# Patient Record
Sex: MALE | Race: WHITE | NOT HISPANIC OR LATINO | Employment: FULL TIME | URBAN - NONMETROPOLITAN AREA
[De-identification: names, ages, dates, MRNs, and addresses within clinical notes are randomized per-mention and may not be internally consistent; named-entity substitution may affect disease eponyms.]

---

## 2019-08-23 ENCOUNTER — OFFICE VISIT (OUTPATIENT)
Dept: FAMILY MEDICINE CLINIC | Facility: CLINIC | Age: 34
End: 2019-08-23

## 2019-08-23 VITALS
HEART RATE: 89 BPM | HEIGHT: 72 IN | SYSTOLIC BLOOD PRESSURE: 132 MMHG | TEMPERATURE: 98 F | OXYGEN SATURATION: 98 % | BODY MASS INDEX: 23.76 KG/M2 | WEIGHT: 175.4 LBS | DIASTOLIC BLOOD PRESSURE: 82 MMHG

## 2019-08-23 DIAGNOSIS — F32.A ANXIETY AND DEPRESSION: Primary | ICD-10-CM

## 2019-08-23 DIAGNOSIS — F41.9 ANXIETY AND DEPRESSION: Primary | ICD-10-CM

## 2019-08-23 PROCEDURE — 99202 OFFICE O/P NEW SF 15 MIN: CPT | Performed by: STUDENT IN AN ORGANIZED HEALTH CARE EDUCATION/TRAINING PROGRAM

## 2019-08-23 RX ORDER — DULOXETIN HYDROCHLORIDE 30 MG/1
30 CAPSULE, DELAYED RELEASE ORAL DAILY
Qty: 30 CAPSULE | Refills: 12 | Status: SHIPPED | OUTPATIENT
Start: 2019-08-23 | End: 2020-10-19 | Stop reason: SDUPTHER

## 2019-08-23 RX ORDER — DULOXETIN HYDROCHLORIDE 30 MG/1
30 CAPSULE, DELAYED RELEASE ORAL DAILY
COMMUNITY
End: 2019-08-23 | Stop reason: SDUPTHER

## 2019-08-23 NOTE — PROGRESS NOTES
Subjective   Wang Edwards is a 34 y.o. male who presents for initial evaluation  for medication refill for his Duloxitine that he has been taking since starting Pharmacy school. 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 Wang lives in [place], and is well informed on his care and medication uses. He stated he wanted to reduce the dose from 40mg to 30mg, which he was previously on, it was effective, and agrees it will suffice for his anxiety/depression management. We discussed follow up in 1 year, unless he needs to see me for anything.       Past Medical Hx:  Past Medical History:   Diagnosis Date   • Anxiety        Past Surgical Hx:  History reviewed. No pertinent surgical history.    Health Maintenance:  Health Maintenance   Topic Date Due   • TDAP/TD VACCINES (1 - Tdap) 06/19/2005   • INFLUENZA VACCINE  08/01/2019   • ANNUAL PHYSICAL  08/24/2020       Current Meds:    Current Outpatient Medications:   •  DULoxetine (CYMBALTA) 30 MG capsule, Take 1 capsule by mouth Daily., Disp: 30 capsule, Rfl: 12    Allergies:  Patient has no known allergies.    Family Hx:  Family History   Problem Relation Age of Onset   • No Known Problems Mother    • No Known Problems Father    • No Known Problems Sister    • No Known Problems Brother    • No Known Problems Daughter    • No Known Problems Son    • No Known Problems Maternal Aunt    • No Known Problems Maternal Uncle    • No Known Problems Paternal Aunt    • No Known Problems Paternal Uncle    • No Known Problems Maternal Grandmother    • No Known Problems Maternal Grandfather    • No Known Problems Paternal Grandmother    • No Known Problems Paternal Grandfather         Social History:  Social History     Socioeconomic History   • Marital status:      Spouse name: Not on file   • Number of children: Not on file   • Years of education: Not on file   • Highest education level: Not on file   Tobacco  "Use   • Smoking status: Never Smoker   • Smokeless tobacco: Never Used   Substance and Sexual Activity   • Alcohol use: No     Frequency: Never   • Drug use: No   • Sexual activity: Yes     Partners: Female       Review of Systems  Review of Systems   Constitutional: Negative for activity change.   Eyes: Negative for visual disturbance.   Respiratory: Negative for cough and shortness of breath.    Cardiovascular: Negative for chest pain.            Objective:     /82 (BP Location: Left arm, Patient Position: Sitting)   Pulse 89   Temp 98 °F (36.7 °C) (Temporal)   Ht 182.9 cm (72\")   Wt 79.6 kg (175 lb 6.4 oz)   SpO2 98%   BMI 23.79 kg/m²       Physical Exam   Constitutional: He is oriented to person, place, and time. He appears well-developed and well-nourished.   HENT:   Head: Normocephalic.   Eyes: Pupils are equal, round, and reactive to light.   Neck: Neck supple.   Cardiovascular: Normal rate, regular rhythm and normal heart sounds.   Pulmonary/Chest: Effort normal and breath sounds normal.   Neurological: He is alert and oriented to person, place, and time.   Psychiatric: He has a normal mood and affect. His behavior is normal. Judgment and thought content normal.       Assessment/Plan:     1. Anxiety and depression   - Refilled Duloxitine; decreased from 40mg to 30mg.  - F/u as needed        Follow-up:     Return in about 1 year (around 8/23/2020) for Recheck.    Goals     None          Preventative:    Vaccines Recommended at this visit:   No Vaccines recommended today. Patient is up to date on all vaccines.     Vaccines Received at this visit:  No Vaccines recommended today. Patient is up to date on all vaccines.     Screenings Recommended at this visit:  No Screenings offered today. Patient is up to date on all screenings at this time.     Screenings Ordered at this visit:  No screenings were offered today. Patient is up to date on all screenings.     Smoking Status:  Patient has never " smoked.    Alcohol Intake:  Patient does not drink    Patient's Body mass index is 23.79 kg/m². BMI is within normal parameters. No follow-up required..         RISK SCORE: 1       This document has been electronically signed by Unique Cabrera MD on August 23, 2019 5:08 PM

## 2019-08-30 NOTE — PROGRESS NOTES
Subjective:     Wang Edwards is a 34 y.o. male who presents for refills for anxiety. 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 He is okay switching to lower dose as the pharmacy has given him some issues with the dose in the past        Past Medical Hx:  Past Medical History:   Diagnosis Date   • Anxiety        Past Surgical Hx:  History reviewed. No pertinent surgical history.    Health Maintenance:  Health Maintenance   Topic Date Due   • TDAP/TD VACCINES (1 - Tdap) 06/19/2005   • INFLUENZA VACCINE  08/01/2019   • ANNUAL PHYSICAL  08/24/2020       Current Meds:    Current Outpatient Medications:   •  DULoxetine (CYMBALTA) 30 MG capsule, Take 1 capsule by mouth Daily., Disp: 30 capsule, Rfl: 12    Allergies:  Patient has no known allergies.    Family Hx:  Family History   Problem Relation Age of Onset   • No Known Problems Mother    • No Known Problems Father    • No Known Problems Sister    • No Known Problems Brother    • No Known Problems Daughter    • No Known Problems Son    • No Known Problems Maternal Aunt    • No Known Problems Maternal Uncle    • No Known Problems Paternal Aunt    • No Known Problems Paternal Uncle    • No Known Problems Maternal Grandmother    • No Known Problems Maternal Grandfather    • No Known Problems Paternal Grandmother    • No Known Problems Paternal Grandfather         Social History:  Social History     Socioeconomic History   • Marital status:      Spouse name: Not on file   • Number of children: Not on file   • Years of education: Not on file   • Highest education level: Not on file   Tobacco Use   • Smoking status: Never Smoker   • Smokeless tobacco: Never Used   Substance and Sexual Activity   • Alcohol use: No     Frequency: Never   • Drug use: No   • Sexual activity: Yes     Partners: Female       Review of Systems  Review of Systems  Per HPI. Other ROS negative  Objective:     /82 (BP Location: Left arm, Patient Position: Sitting)    "Pulse 89   Temp 98 °F (36.7 °C) (Temporal)   Ht 182.9 cm (72\")   Wt 79.6 kg (175 lb 6.4 oz)   SpO2 98%   BMI 23.79 kg/m²   Physical Exam    NAD  ncat  perrl EOMI  Alert and oriented x 3  Hearing intact, normal ext ears bilat  Neck supple  RRR, no mRG  Ctab, no WRR  SNT  MAEE, no issues, normal tone  Cn grossly intact  Psych: appropriate    Lab Review  No results found for this or any previous visit.         Assessment:     Wang was seen today for annual exam and depression.    Diagnoses and all orders for this visit:    Anxiety and depression  -     DULoxetine (CYMBALTA) 30 MG capsule; Take 1 capsule by mouth Daily.        Plan:     I have seen and examined the patient.  I have reviewed the notes, assessments, and/or procedures performed by *Unique Cabrera MD  *, I concur with her/his documentation and assessment and plan for Wang Edwards. Will refill meds and see back prn                    This document has been electronically signed by Juan Luis Adam MD on August 30, 2019 8:40 AM    "

## 2020-01-06 DIAGNOSIS — J06.9 UPPER RESPIRATORY TRACT INFECTION, UNSPECIFIED TYPE: Primary | ICD-10-CM

## 2020-01-06 RX ORDER — AMOXICILLIN AND CLAVULANATE POTASSIUM 875; 125 MG/1; MG/1
1 TABLET, FILM COATED ORAL 2 TIMES DAILY
Qty: 10 TABLET | Refills: 0 | Status: SHIPPED | OUTPATIENT
Start: 2020-01-06 | End: 2020-01-11

## 2020-09-04 RX ORDER — CETIRIZINE HYDROCHLORIDE 10 MG/1
10 TABLET ORAL DAILY
Qty: 30 TABLET | Refills: 11 | Status: SHIPPED | OUTPATIENT
Start: 2020-09-04 | End: 2021-03-25 | Stop reason: SDUPTHER

## 2020-09-04 RX ORDER — FLUTICASONE PROPIONATE 50 MCG
2 SPRAY, SUSPENSION (ML) NASAL DAILY
Qty: 18.2 ML | Refills: 11 | Status: SHIPPED | OUTPATIENT
Start: 2020-09-04 | End: 2021-03-25 | Stop reason: SDUPTHER

## 2020-09-04 RX ORDER — ALBUTEROL SULFATE 90 UG/1
2 AEROSOL, METERED RESPIRATORY (INHALATION) EVERY 4 HOURS PRN
Qty: 18 G | Refills: 11 | Status: SHIPPED | OUTPATIENT
Start: 2020-09-04 | End: 2021-03-25 | Stop reason: SDUPTHER

## 2020-09-04 RX ORDER — OMEPRAZOLE 40 MG/1
40 CAPSULE, DELAYED RELEASE ORAL DAILY
Qty: 30 CAPSULE | Refills: 11 | Status: SHIPPED | OUTPATIENT
Start: 2020-09-04 | End: 2021-03-25 | Stop reason: SDUPTHER

## 2020-09-29 DIAGNOSIS — M79.672 FOOT PAIN, LEFT: Primary | ICD-10-CM

## 2020-10-14 ENCOUNTER — OFFICE VISIT (OUTPATIENT)
Dept: PODIATRY | Facility: CLINIC | Age: 35
End: 2020-10-14

## 2020-10-14 VITALS — OXYGEN SATURATION: 98 % | HEIGHT: 71 IN | BODY MASS INDEX: 28.87 KG/M2 | WEIGHT: 206.2 LBS | HEART RATE: 87 BPM

## 2020-10-14 DIAGNOSIS — D49.2 SOFT TISSUE TUMOR OF LEFT FOOT: ICD-10-CM

## 2020-10-14 DIAGNOSIS — M79.672 LEFT FOOT PAIN: Primary | ICD-10-CM

## 2020-10-14 PROCEDURE — 99203 OFFICE O/P NEW LOW 30 MIN: CPT | Performed by: PODIATRIST

## 2020-10-14 NOTE — PROGRESS NOTES
Wang Edwards  1985  35 y.o. male     Patient presents today with a complaint of a painful nodule that is on his left foot; xray obtained today.    10/14/2020    Chief Complaint   Patient presents with   • Left Foot - Pain       History of Present Illness    Wang Edwards is a 35 y.o.male who presents with chief complaint of painful nodule on the inside of his left foot.  Nodule has been present for several years.  However over the last 2 years it has gotten progressively larger and more painful.  Pain is aggravated with pressure and weightbearing.  Pain is relieved with rest.  There are no associated injuries.  Patient has done nothing to treat the nodule.  He has no other complaints today.    Past Medical History:   Diagnosis Date   • Anxiety    • Plantar fasciitis    • Verruca          History reviewed. No pertinent surgical history.      Family History   Problem Relation Age of Onset   • No Known Problems Mother    • No Known Problems Father    • No Known Problems Sister    • No Known Problems Brother    • No Known Problems Daughter    • No Known Problems Son    • No Known Problems Maternal Aunt    • No Known Problems Maternal Uncle    • No Known Problems Paternal Aunt    • No Known Problems Paternal Uncle    • No Known Problems Maternal Grandmother    • No Known Problems Maternal Grandfather    • No Known Problems Paternal Grandmother    • No Known Problems Paternal Grandfather        No Known Allergies    Social History     Socioeconomic History   • Marital status:      Spouse name: Not on file   • Number of children: Not on file   • Years of education: Not on file   • Highest education level: Not on file   Tobacco Use   • Smoking status: Never Smoker   • Smokeless tobacco: Never Used   Substance and Sexual Activity   • Alcohol use: Yes     Frequency: 2-3 times a week   • Drug use: No   • Sexual activity: Yes     Partners: Female         Current Outpatient Medications   Medication Sig Dispense  "Refill   • albuterol sulfate  (90 Base) MCG/ACT inhaler Inhale 2 puffs Every 4 (Four) Hours As Needed for Wheezing. 18 g 11   • cetirizine (zyrTEC) 10 MG tablet Take 1 tablet by mouth Daily. 30 tablet 11   • DULoxetine (CYMBALTA) 30 MG capsule Take 1 capsule by mouth Daily. 30 capsule 12   • fluticasone (Flonase) 50 MCG/ACT nasal spray 2 sprays into the nostril(s) as directed by provider Daily. 18.2 mL 11   • omeprazole (PrilOSEC) 40 MG capsule Take 1 capsule by mouth Daily. 30 capsule 11     No current facility-administered medications for this visit.        Review of Systems   Constitutional: Negative.    HENT: Negative.    Eyes: Negative.    Respiratory: Negative.    Cardiovascular: Negative.    Gastrointestinal: Negative.    Endocrine: Negative.    Genitourinary: Negative.    Musculoskeletal:        Left foot pain     Skin: Negative.    Psychiatric/Behavioral: Negative.         Depression           OBJECTIVE    Pulse 87   Ht 180.3 cm (71\")   Wt 93.5 kg (206 lb 3.2 oz)   SpO2 98%   BMI 28.76 kg/m²       Physical Exam  Vitals signs reviewed.   Constitutional:       General: He is not in acute distress.     Appearance: He is well-developed.   HENT:      Head: Normocephalic and atraumatic.      Nose: Nose normal.   Eyes:      Conjunctiva/sclera: Conjunctivae normal.      Pupils: Pupils are equal, round, and reactive to light.   Cardiovascular:      Pulses:           Dorsalis pedis pulses are 2+ on the left side.        Posterior tibial pulses are 2+ on the left side.   Pulmonary:      Effort: Pulmonary effort is normal. No respiratory distress.      Breath sounds: No wheezing.   Musculoskeletal: Normal range of motion.         General: Tenderness present. No signs of injury.      Left foot: Normal range of motion. Deformity present. No prominent metatarsal heads.        Feet:    Feet:      Left foot:      Protective Sensation: 5 sites tested. 5 sites sensed.      Skin integrity: Skin integrity normal.     "  Toenail Condition: Left toenails are normal.   Skin:     General: Skin is warm and dry.      Capillary Refill: Capillary refill takes less than 2 seconds.   Neurological:      Mental Status: He is alert and oriented to person, place, and time.   Psychiatric:         Behavior: Behavior normal.         Thought Content: Thought content normal.         Gait: normal     Assistive Device: none         Procedures        ASSESSMENT AND PLAN    Diagnoses and all orders for this visit:    1. Left foot pain (Primary)  -     XR Foot 3+ View Left    2. Soft tissue tumor of left foot  -     MRI Foot Left With & Without Contrast; Future      - Comprehensive foot and ankle exam performed.   -Radiographs taken reviewed.  No acute osseous or articular normality's.  MRI ordered to further evaluate soft tissue mass for potential surgical excision.  - All questions were answered to the patients satisfaction.  - RTC after MRI          This document has been electronically signed by Gareth Rabago DPM on October 14, 2020 09:58 CDT     10/14/2020  09:58 CDT

## 2020-10-19 DIAGNOSIS — F32.A ANXIETY AND DEPRESSION: ICD-10-CM

## 2020-10-19 DIAGNOSIS — F41.9 ANXIETY AND DEPRESSION: ICD-10-CM

## 2020-10-20 RX ORDER — DULOXETIN HYDROCHLORIDE 30 MG/1
30 CAPSULE, DELAYED RELEASE ORAL DAILY
Qty: 30 CAPSULE | Refills: 12 | Status: SHIPPED | OUTPATIENT
Start: 2020-10-20 | End: 2021-03-25

## 2020-10-21 ENCOUNTER — APPOINTMENT (OUTPATIENT)
Dept: MRI IMAGING | Facility: HOSPITAL | Age: 35
End: 2020-10-21

## 2020-10-27 ENCOUNTER — HOSPITAL ENCOUNTER (OUTPATIENT)
Dept: MRI IMAGING | Facility: HOSPITAL | Age: 35
Discharge: HOME | End: 2020-10-27
Admitting: PODIATRIST

## 2020-10-27 ENCOUNTER — PROCEDURE VISIT (OUTPATIENT)
Dept: FAMILY MEDICINE CLINIC | Facility: CLINIC | Age: 35
End: 2020-10-27

## 2020-10-27 DIAGNOSIS — L91.0 KELOID SCAR OF SKIN: ICD-10-CM

## 2020-10-27 DIAGNOSIS — L98.9 SKIN LESION OF FACE: ICD-10-CM

## 2020-10-27 DIAGNOSIS — L98.9 SKIN LESION OF BACK: Primary | ICD-10-CM

## 2020-10-27 DIAGNOSIS — D49.2 SOFT TISSUE TUMOR OF LEFT FOOT: ICD-10-CM

## 2020-10-27 PROCEDURE — 11102 TANGNTL BX SKIN SINGLE LES: CPT | Performed by: STUDENT IN AN ORGANIZED HEALTH CARE EDUCATION/TRAINING PROGRAM

## 2020-10-27 PROCEDURE — A9579 GAD-BASE MR CONTRAST NOS,1ML: HCPCS | Performed by: PODIATRIST

## 2020-10-27 PROCEDURE — 73720 MRI LWR EXTREMITY W/O&W/DYE: CPT

## 2020-10-27 PROCEDURE — 25010000002 GADOTERIDOL PER 1 ML: Performed by: PODIATRIST

## 2020-10-27 PROCEDURE — 11301 SHAVE SKIN LESION 0.6-1.0 CM: CPT | Performed by: STUDENT IN AN ORGANIZED HEALTH CARE EDUCATION/TRAINING PROGRAM

## 2020-10-27 RX ADMIN — GADOTERIDOL 20 ML: 279.3 INJECTION, SOLUTION INTRAVENOUS at 13:37
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
## 2020-10-27 NOTE — PROGRESS NOTES
I was present throughout the encounter and assisted Dr. Pete in the procedure.  I have seen the patient.  I have reviewed the notes, assessments, and/or procedures performed by Dr. Pete, I concur with her/his documentation and assessment and plan for Wang Edwards.               This document has been electronically signed by Louie Hinkle MD on October 27, 2020 16:28 CDT
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
## 2020-10-30 LAB
LAB AP CASE REPORT: NORMAL
PATH REPORT.FINAL DX SPEC: NORMAL

## 2020-11-04 ENCOUNTER — OFFICE VISIT (OUTPATIENT)
Dept: PODIATRY | Facility: CLINIC | Age: 35
End: 2020-11-04

## 2020-11-04 VITALS — HEIGHT: 71 IN | BODY MASS INDEX: 28.84 KG/M2 | HEART RATE: 95 BPM | WEIGHT: 206 LBS | OXYGEN SATURATION: 98 %

## 2020-11-04 DIAGNOSIS — M62.89 MUSCLE HYPERTROPHY: ICD-10-CM

## 2020-11-04 DIAGNOSIS — R22.42 MASS OF LEFT FOOT: ICD-10-CM

## 2020-11-04 DIAGNOSIS — M79.672 LEFT FOOT PAIN: Primary | ICD-10-CM

## 2020-11-04 PROCEDURE — 99214 OFFICE O/P EST MOD 30 MIN: CPT | Performed by: PODIATRIST

## 2020-11-04 NOTE — PROGRESS NOTES
Wang Edwards  1985  35 y.o. male     Patient presents today for MRI results on left foot. Patient states pain is a 3/10.    11/04/2020     Chief Complaint   Patient presents with   • Left Foot - Follow-up, MRI results       History of Present Illness    Wang Edwards is a 35 y.o.male who presents to clinic for MRI results.  Continues to complain of pain to the left foot localized to a suspected soft tissue mass.  Currently rates his pain as a 3 out of 10.  Denies any change in his symptoms.    Past Medical History:   Diagnosis Date   • Anxiety    • Plantar fasciitis    • Verruca          History reviewed. No pertinent surgical history.      Family History   Problem Relation Age of Onset   • No Known Problems Mother    • No Known Problems Father    • No Known Problems Sister    • No Known Problems Brother    • No Known Problems Daughter    • No Known Problems Son    • No Known Problems Maternal Aunt    • No Known Problems Maternal Uncle    • No Known Problems Paternal Aunt    • No Known Problems Paternal Uncle    • No Known Problems Maternal Grandmother    • No Known Problems Maternal Grandfather    • No Known Problems Paternal Grandmother    • No Known Problems Paternal Grandfather        No Known Allergies    Social History     Socioeconomic History   • Marital status:      Spouse name: Not on file   • Number of children: Not on file   • Years of education: Not on file   • Highest education level: Not on file   Tobacco Use   • Smoking status: Never Smoker   • Smokeless tobacco: Never Used   Substance and Sexual Activity   • Alcohol use: Yes     Frequency: 2-3 times a week   • Drug use: No   • Sexual activity: Yes     Partners: Female         Current Outpatient Medications   Medication Sig Dispense Refill   • albuterol sulfate  (90 Base) MCG/ACT inhaler Inhale 2 puffs Every 4 (Four) Hours As Needed for Wheezing. 18 g 11   • cetirizine (zyrTEC) 10 MG tablet Take 1 tablet by mouth  "Daily. 30 tablet 11   • DULoxetine (CYMBALTA) 30 MG capsule Take 1 capsule by mouth Daily. 30 capsule 12   • fluticasone (Flonase) 50 MCG/ACT nasal spray 2 sprays into the nostril(s) as directed by provider Daily. 18.2 mL 11   • omeprazole (PrilOSEC) 40 MG capsule Take 1 capsule by mouth Daily. 30 capsule 11     No current facility-administered medications for this visit.        Review of Systems   Constitutional: Negative.    HENT: Negative.    Eyes: Negative.    Respiratory: Negative.    Cardiovascular: Negative.    Gastrointestinal: Negative.    Endocrine: Negative.    Genitourinary: Negative.    Musculoskeletal:        Left foot pain     Skin: Negative.    Neurological: Negative.    Psychiatric/Behavioral: Negative.         Depression           OBJECTIVE    Pulse 95   Ht 180.3 cm (71\")   Wt 93.4 kg (206 lb)   SpO2 98%   BMI 28.73 kg/m²       Physical Exam  Vitals signs reviewed.   Constitutional:       General: He is not in acute distress.     Appearance: He is well-developed.   HENT:      Head: Normocephalic and atraumatic.      Nose: Nose normal.   Eyes:      Conjunctiva/sclera: Conjunctivae normal.      Pupils: Pupils are equal, round, and reactive to light.   Cardiovascular:      Pulses:           Dorsalis pedis pulses are 2+ on the left side.        Posterior tibial pulses are 2+ on the left side.   Pulmonary:      Effort: Pulmonary effort is normal. No respiratory distress.      Breath sounds: No wheezing.   Musculoskeletal: Normal range of motion.         General: Tenderness present. No signs of injury.      Left foot: Normal range of motion. Deformity present. No prominent metatarsal heads.        Feet:    Feet:      Left foot:      Protective Sensation: 5 sites tested. 5 sites sensed.      Skin integrity: Skin integrity normal.      Toenail Condition: Left toenails are normal.   Skin:     General: Skin is warm and dry.      Capillary Refill: Capillary refill takes less than 2 seconds. "   Neurological:      Mental Status: He is alert and oriented to person, place, and time.   Psychiatric:         Behavior: Behavior normal.         Thought Content: Thought content normal.         Gait: normal     Assistive Device: none         Procedures        ASSESSMENT AND PLAN    Diagnoses and all orders for this visit:    1. Left foot pain (Primary)    2. Muscle hypertrophy  -     Case Request; Standing  -     ceFAZolin (ANCEF) 2 g in sodium chloride 0.9 % 100 mL IVPB  -     Case Request    3. Mass of left foot  -     Case Request; Standing  -     ceFAZolin (ANCEF) 2 g in sodium chloride 0.9 % 100 mL IVPB  -     Case Request    Other orders  -     Follow Anesthesia Guidelines / Standing Orders; Standing  -     Verify NPO Status; Standing  -     Obtain informed consent (if not collected inpatient or PAT); Standing  -     Notify Physician - Standard; Standing  -     Follow Anesthesia Guidelines / Standing Orders; Future      -MRI reviewed with patient.  No obvious soft tissue mass noted.  Area in question appears to be hypertrophy of the abductor hallucis muscle.  Discussed treatment options.  At this time patient has significant and increasing pain to the area in question with weightbearing.  He has elected for surgical intervention.  -Surgical plan is excision of soft tissue mass/resection of hypertrophied muscle left foot and all other indicated procedures.  -Risks and benefits of the procedure including but not limited to postoperative infection, incisional dehiscence, numbness, swelling, residual pain and postoperative blood clot discussed in detail.  No guarantees were given or implied.  -Tentative date for the surgery 12/10/20  -All questions were answered  -Recheck following surgery           This document has been electronically signed by Gareth Rabago DPM on November 6, 2020 14:41 CST     11/6/2020  14:41 CST

## 2020-11-06 ENCOUNTER — HOSPITAL ENCOUNTER (OUTPATIENT)
Facility: HOSPITAL | Age: 35
Setting detail: HOSPITAL OUTPATIENT SURGERY
End: 2020-11-06
Attending: PODIATRIST | Admitting: PODIATRIST

## 2020-11-06 PROBLEM — M62.89 MUSCLE HYPERTROPHY: Status: ACTIVE | Noted: 2020-11-06

## 2020-11-06 PROBLEM — R22.42 MASS OF LEFT FOOT: Status: ACTIVE | Noted: 2020-11-06

## 2020-12-18 ENCOUNTER — OFFICE VISIT (OUTPATIENT)
Dept: FAMILY MEDICINE CLINIC | Facility: CLINIC | Age: 35
End: 2020-12-18

## 2020-12-18 DIAGNOSIS — G89.29 CHRONIC PAIN OF RIGHT KNEE: Primary | ICD-10-CM

## 2020-12-18 DIAGNOSIS — M25.561 CHRONIC PAIN OF RIGHT KNEE: Primary | ICD-10-CM

## 2020-12-18 PROCEDURE — 20610 DRAIN/INJ JOINT/BURSA W/O US: CPT | Performed by: STUDENT IN AN ORGANIZED HEALTH CARE EDUCATION/TRAINING PROGRAM

## 2020-12-18 NOTE — PROGRESS NOTES
"Procedure   Injection Tendon or Ligament    Date/Time: 12/18/2020 4:07 PM  Performed by: Jose Miguel Paris MD  Authorized by: Jose Miguel Paris MD   Consent: Verbal consent obtained.  Risks and benefits: risks, benefits and alternatives were discussed  Consent given by: patient  Patient understanding: patient states understanding of the procedure being performed  Patient consent: the patient's understanding of the procedure matches consent given  Procedure consent: procedure consent matches procedure scheduled  Site marked: the operative site was marked  Imaging studies: imaging studies not available  Patient identity confirmed: verbally with patient  Time out: Immediately prior to procedure a \"time out\" was called to verify the correct patient, procedure, equipment, support staff and site/side marked as required.  Preparation: Patient was prepped and draped in the usual sterile fashion.  Local anesthesia used: no    Anesthesia:  Local anesthesia used: no    Sedation:  Patient sedated: no    Patient tolerance: patient tolerated the procedure well with no immediate complications  Comments: Verbal consent for Right knee injection was obtained. Time out procedure was performed. Landmarks were palpated and marked. Site was sterilized with alcohol. 2 cc of sensorcaine, 2cc lidocaine and 1 cc kenalog was used for injection. Band aid was applied. Patient tolerated procedure without incident.         Signed,   Jose Miguel Paris MD  Family Medicine Resident PGY3  [facility]        This document has been electronically signed by Jose Miguel Paris MD on December 18, 2020 16:10 CST         "

## 2020-12-23 NOTE — PROGRESS NOTES
I was present throughout the procedure.  I have seen the patient.  I have reviewed the notes, assessments, and/or procedures performed by Dr. Paris, I concur with her/his documentation and assessment and plan for Wang Edwards.               This document has been electronically signed by Louie Hinkle MD on December 23, 2020 09:44 CST

## 2021-03-18 DIAGNOSIS — F41.9 ANXIETY AND DEPRESSION: Primary | ICD-10-CM

## 2021-03-18 DIAGNOSIS — F32.A ANXIETY AND DEPRESSION: Primary | ICD-10-CM

## 2021-03-23 ENCOUNTER — LAB (OUTPATIENT)
Dept: LAB | Facility: HOSPITAL | Age: 36
End: 2021-03-23

## 2021-03-23 DIAGNOSIS — F41.9 ANXIETY AND DEPRESSION: ICD-10-CM

## 2021-03-23 DIAGNOSIS — F32.A ANXIETY AND DEPRESSION: ICD-10-CM

## 2021-03-23 LAB
ALBUMIN SERPL-MCNC: 4.4 G/DL (ref 3.5–5.2)
ALBUMIN/GLOB SERPL: 1.8 G/DL
ALP SERPL-CCNC: 62 U/L (ref 39–117)
ALT SERPL W P-5'-P-CCNC: 21 U/L (ref 1–41)
ANION GAP SERPL CALCULATED.3IONS-SCNC: 8.3 MMOL/L (ref 5–15)
AST SERPL-CCNC: 23 U/L (ref 1–40)
BASOPHILS # BLD AUTO: 0.05 10*3/MM3 (ref 0–0.2)
BASOPHILS NFR BLD AUTO: 0.6 % (ref 0–1.5)
BILIRUB SERPL-MCNC: 0.4 MG/DL (ref 0–1.2)
BUN SERPL-MCNC: 14 MG/DL (ref 6–20)
BUN/CREAT SERPL: 11.6 (ref 7–25)
CALCIUM SPEC-SCNC: 9.3 MG/DL (ref 8.6–10.5)
CHLORIDE SERPL-SCNC: 104 MMOL/L (ref 98–107)
CHOLEST SERPL-MCNC: 161 MG/DL (ref 0–200)
CO2 SERPL-SCNC: 28.7 MMOL/L (ref 22–29)
CREAT SERPL-MCNC: 1.21 MG/DL (ref 0.76–1.27)
DEPRECATED RDW RBC AUTO: 37.6 FL (ref 37–54)
EOSINOPHIL # BLD AUTO: 0.2 10*3/MM3 (ref 0–0.4)
EOSINOPHIL NFR BLD AUTO: 2.6 % (ref 0.3–6.2)
ERYTHROCYTE [DISTWIDTH] IN BLOOD BY AUTOMATED COUNT: 11.9 % (ref 12.3–15.4)
GFR SERPL CREATININE-BSD FRML MDRD: 69 ML/MIN/1.73
GLOBULIN UR ELPH-MCNC: 2.5 GM/DL
GLUCOSE SERPL-MCNC: 109 MG/DL (ref 65–99)
HCT VFR BLD AUTO: 42.4 % (ref 37.5–51)
HCV AB SER DONR QL: NORMAL
HDLC SERPL-MCNC: 54 MG/DL (ref 40–60)
HGB BLD-MCNC: 14.9 G/DL (ref 13–17.7)
IMM GRANULOCYTES # BLD AUTO: 0.05 10*3/MM3 (ref 0–0.05)
IMM GRANULOCYTES NFR BLD AUTO: 0.6 % (ref 0–0.5)
LDLC SERPL CALC-MCNC: 87 MG/DL (ref 0–100)
LDLC/HDLC SERPL: 1.57 {RATIO}
LYMPHOCYTES # BLD AUTO: 2.51 10*3/MM3 (ref 0.7–3.1)
LYMPHOCYTES NFR BLD AUTO: 32.4 % (ref 19.6–45.3)
MCH RBC QN AUTO: 30.5 PG (ref 26.6–33)
MCHC RBC AUTO-ENTMCNC: 35.1 G/DL (ref 31.5–35.7)
MCV RBC AUTO: 86.7 FL (ref 79–97)
MONOCYTES # BLD AUTO: 0.57 10*3/MM3 (ref 0.1–0.9)
MONOCYTES NFR BLD AUTO: 7.4 % (ref 5–12)
NEUTROPHILS NFR BLD AUTO: 4.37 10*3/MM3 (ref 1.7–7)
NEUTROPHILS NFR BLD AUTO: 56.4 % (ref 42.7–76)
NRBC BLD AUTO-RTO: 0 /100 WBC (ref 0–0.2)
PLATELET # BLD AUTO: 203 10*3/MM3 (ref 140–450)
PMV BLD AUTO: 11.8 FL (ref 6–12)
POTASSIUM SERPL-SCNC: 4.4 MMOL/L (ref 3.5–5.2)
PROT SERPL-MCNC: 6.9 G/DL (ref 6–8.5)
RBC # BLD AUTO: 4.89 10*6/MM3 (ref 4.14–5.8)
SODIUM SERPL-SCNC: 141 MMOL/L (ref 136–145)
T4 FREE SERPL-MCNC: 1.3 NG/DL (ref 0.93–1.7)
TRIGL SERPL-MCNC: 112 MG/DL (ref 0–150)
TSH SERPL DL<=0.05 MIU/L-ACNC: 2.51 UIU/ML (ref 0.27–4.2)
VLDLC SERPL-MCNC: 20 MG/DL (ref 5–40)
WBC # BLD AUTO: 7.75 10*3/MM3 (ref 3.4–10.8)

## 2021-03-23 PROCEDURE — 86803 HEPATITIS C AB TEST: CPT

## 2021-03-23 PROCEDURE — 85025 COMPLETE CBC W/AUTO DIFF WBC: CPT

## 2021-03-23 PROCEDURE — 80061 LIPID PANEL: CPT

## 2021-03-23 PROCEDURE — 84443 ASSAY THYROID STIM HORMONE: CPT

## 2021-03-23 PROCEDURE — 80053 COMPREHEN METABOLIC PANEL: CPT

## 2021-03-23 PROCEDURE — 36415 COLL VENOUS BLD VENIPUNCTURE: CPT

## 2021-03-23 PROCEDURE — 84439 ASSAY OF FREE THYROXINE: CPT

## 2021-03-25 ENCOUNTER — OFFICE VISIT (OUTPATIENT)
Dept: FAMILY MEDICINE CLINIC | Facility: CLINIC | Age: 36
End: 2021-03-25

## 2021-03-25 VITALS
DIASTOLIC BLOOD PRESSURE: 76 MMHG | HEIGHT: 71 IN | BODY MASS INDEX: 28.8 KG/M2 | WEIGHT: 205.7 LBS | OXYGEN SATURATION: 94 % | HEART RATE: 91 BPM | SYSTOLIC BLOOD PRESSURE: 140 MMHG

## 2021-03-25 DIAGNOSIS — F41.9 ANXIETY AND DEPRESSION: ICD-10-CM

## 2021-03-25 DIAGNOSIS — F32.A ANXIETY AND DEPRESSION: ICD-10-CM

## 2021-03-25 PROCEDURE — 99213 OFFICE O/P EST LOW 20 MIN: CPT | Performed by: STUDENT IN AN ORGANIZED HEALTH CARE EDUCATION/TRAINING PROGRAM

## 2021-03-25 RX ORDER — ESCITALOPRAM OXALATE 10 MG/1
10 TABLET ORAL DAILY
Qty: 30 TABLET | Refills: 3 | Status: SHIPPED | OUTPATIENT
Start: 2021-03-25 | End: 2021-08-04 | Stop reason: SDUPTHER

## 2021-03-25 RX ORDER — OMEPRAZOLE 40 MG/1
40 CAPSULE, DELAYED RELEASE ORAL DAILY
Qty: 30 CAPSULE | Refills: 11 | Status: SHIPPED | OUTPATIENT
Start: 2021-03-25 | End: 2021-08-04 | Stop reason: SDUPTHER

## 2021-03-25 RX ORDER — CETIRIZINE HYDROCHLORIDE 10 MG/1
10 TABLET ORAL DAILY
Qty: 30 TABLET | Refills: 11 | Status: SHIPPED | OUTPATIENT
Start: 2021-03-25

## 2021-03-25 RX ORDER — CETIRIZINE HYDROCHLORIDE 10 MG/1
10 TABLET ORAL DAILY
Qty: 30 TABLET | Refills: 11 | Status: SHIPPED | OUTPATIENT
Start: 2021-03-25 | End: 2021-03-25

## 2021-03-25 RX ORDER — OMEPRAZOLE 40 MG/1
40 CAPSULE, DELAYED RELEASE ORAL DAILY
Qty: 30 CAPSULE | Refills: 11 | Status: SHIPPED | OUTPATIENT
Start: 2021-03-25 | End: 2021-03-25

## 2021-03-25 RX ORDER — FLUTICASONE PROPIONATE 50 MCG
2 SPRAY, SUSPENSION (ML) NASAL DAILY
Qty: 18.2 ML | Refills: 11 | Status: SHIPPED | OUTPATIENT
Start: 2021-03-25

## 2021-03-25 RX ORDER — ALBUTEROL SULFATE 90 UG/1
2 AEROSOL, METERED RESPIRATORY (INHALATION) EVERY 4 HOURS PRN
Qty: 18 G | Refills: 11 | Status: SHIPPED | OUTPATIENT
Start: 2021-03-25

## 2021-03-25 RX ORDER — ALBUTEROL SULFATE 90 UG/1
2 AEROSOL, METERED RESPIRATORY (INHALATION) EVERY 4 HOURS PRN
Qty: 18 G | Refills: 11 | Status: SHIPPED | OUTPATIENT
Start: 2021-03-25 | End: 2021-03-25

## 2021-03-25 RX ORDER — DULOXETIN HYDROCHLORIDE 30 MG/1
30 CAPSULE, DELAYED RELEASE ORAL DAILY
Qty: 30 CAPSULE | Refills: 12 | Status: CANCELLED | OUTPATIENT
Start: 2021-03-25

## 2021-03-25 RX ORDER — ESCITALOPRAM OXALATE 10 MG/1
10 TABLET ORAL DAILY
Qty: 30 TABLET | Refills: 3 | Status: SHIPPED | OUTPATIENT
Start: 2021-03-25 | End: 2021-03-25

## 2021-03-25 RX ORDER — FLUTICASONE PROPIONATE 50 MCG
2 SPRAY, SUSPENSION (ML) NASAL DAILY
Qty: 18.2 ML | Refills: 11 | Status: SHIPPED | OUTPATIENT
Start: 2021-03-25 | End: 2021-03-25

## 2021-03-25 NOTE — PROGRESS NOTES
Family Medicine Residency  Unique Cabrera MD    Subjective:     Wang Edwards is a 35 y.o. male who presents for anxiety.    Anxiety  Following up on anxiety medication. Notes side effects of premature ejaculation, would like to switch. 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 Increased tension with his wife, states that she mentioned divorce after him not using a coaster. Assumes she is stressed and has encouraged her to come and establish care with our clinic. Cognizant of relationship with wife and will continue to work on making improvements.     The following portions of the patient's history were reviewed and updated as appropriate: allergies, current medications, past family history, past medical history, past social history, past surgical history and problem list.    Past Medical Hx:  Past Medical History:   Diagnosis Date   • Anxiety    • Plantar fasciitis    • Verruca        Past Surgical Hx:  History reviewed. No pertinent surgical history.    Current Meds:    Current Outpatient Medications:   •  albuterol sulfate  (90 Base) MCG/ACT inhaler, Inhale 2 puffs Every 4 (Four) Hours As Needed for Wheezing., Disp: 18 g, Rfl: 11  •  cetirizine (zyrTEC) 10 MG tablet, Take 1 tablet by mouth Daily., Disp: 30 tablet, Rfl: 11  •  fluticasone (Flonase) 50 MCG/ACT nasal spray, 2 sprays into the nostril(s) as directed by provider Daily., Disp: 18.2 mL, Rfl: 11  •  omeprazole (priLOSEC) 40 MG capsule, Take 1 capsule by mouth Daily., Disp: 30 capsule, Rfl: 11  •  escitalopram (Lexapro) 10 MG tablet, Take 1 tablet by mouth Daily., Disp: 30 tablet, Rfl: 3    Allergies:  No Known Allergies    Family Hx:  Family History   Problem Relation Age of Onset   • No Known Problems Mother    • No Known Problems Father    • No Known Problems Sister    • No Known Problems Brother    • No Known Problems Daughter    • No Known Problems Son    • No Known Problems Maternal Aunt    • No Known  "Problems Maternal Uncle    • No Known Problems Paternal Aunt    • No Known Problems Paternal Uncle    • No Known Problems Maternal Grandmother    • No Known Problems Maternal Grandfather    • No Known Problems Paternal Grandmother    • No Known Problems Paternal Grandfather         Social History:  Social History     Socioeconomic History   • Marital status:      Spouse name: Not on file   • Number of children: Not on file   • Years of education: Not on file   • Highest education level: Not on file   Tobacco Use   • Smoking status: Never Smoker   • Smokeless tobacco: Never Used   Substance and Sexual Activity   • Alcohol use: Yes   • Drug use: No   • Sexual activity: Yes     Partners: Female       Review of Systems  Review of Systems   Constitutional: Negative for chills and fever.   HENT: Negative for rhinorrhea and sore throat.    Eyes: Negative for photophobia and visual disturbance.   Respiratory: Negative for cough and shortness of breath.    Cardiovascular: Negative for chest pain and palpitations.   Gastrointestinal: Negative for abdominal pain and nausea.   Genitourinary: Positive for discharge ( Premature ejaculation during intercourse). Negative for difficulty urinating, dysuria, penile pain, penile swelling and scrotal swelling.   Musculoskeletal: Negative for arthralgias and back pain.   Neurological: Negative for weakness, light-headedness and headaches.   Psychiatric/Behavioral: Negative for dysphoric mood and sleep disturbance.   All other systems reviewed and are negative.      Objective:     /76   Pulse 91   Ht 180.3 cm (71\")   Wt 93.3 kg (205 lb 11.2 oz)   SpO2 94%   BMI 28.69 kg/m²   Physical Exam  Vitals reviewed.   Constitutional:       Appearance: Normal appearance. He is well-developed, well-groomed and overweight.      Interventions: Face mask in place.   HENT:      Head: Normocephalic.      Right Ear: Hearing and external ear normal.      Left Ear: Hearing and external ear " normal.      Nose: Nose normal.      Mouth/Throat:      Lips: Pink.      Mouth: Mucous membranes are moist.      Pharynx: Oropharynx is clear.   Eyes:      General: Lids are normal.      Conjunctiva/sclera: Conjunctivae normal.      Pupils: Pupils are equal, round, and reactive to light.   Cardiovascular:      Rate and Rhythm: Normal rate and regular rhythm. Occasional extrasystoles are present.     Pulses: Normal pulses.      Heart sounds: Normal heart sounds. No murmur heard.   No friction rub. No gallop.    Pulmonary:      Effort: Pulmonary effort is normal.      Breath sounds: Normal breath sounds and air entry. No wheezing, rhonchi or rales.   Abdominal:      General: Bowel sounds are normal.      Palpations: Abdomen is soft.   Musculoskeletal:      Cervical back: Neck supple.   Skin:     General: Skin is warm.   Neurological:      Mental Status: He is alert and oriented to person, place, and time.   Psychiatric:         Attention and Perception: Attention and perception normal.         Mood and Affect: Mood and affect normal.         Speech: Speech normal.         Behavior: Behavior normal. Behavior is cooperative.         Thought Content: Thought content normal.         Cognition and Memory: Cognition and memory normal.         Judgment: Judgment normal.          Assessment/Plan:     Diagnoses and all orders for this visit:    1. Anxiety and depression  -     Discontinue: albuterol sulfate  (90 Base) MCG/ACT inhaler; Inhale 2 puffs Every 4 (Four) Hours As Needed for Wheezing.  Dispense: 18 g; Refill: 11  -     Discontinue: cetirizine (zyrTEC) 10 MG tablet; Take 1 tablet by mouth Daily.  Dispense: 30 tablet; Refill: 11  -     Discontinue: fluticasone (Flonase) 50 MCG/ACT nasal spray; 2 sprays into the nostril(s) as directed by provider Daily.  Dispense: 18.2 mL; Refill: 11  -     Discontinue: omeprazole (priLOSEC) 40 MG capsule; Take 1 capsule by mouth Daily.  Dispense: 30 capsule; Refill: 11  -      Discontinue: escitalopram (Lexapro) 10 MG tablet; Take 1 tablet by mouth Daily.  Dispense: 30 tablet; Refill: 3  -     albuterol sulfate  (90 Base) MCG/ACT inhaler; Inhale 2 puffs Every 4 (Four) Hours As Needed for Wheezing.  Dispense: 18 g; Refill: 11  -     cetirizine (zyrTEC) 10 MG tablet; Take 1 tablet by mouth Daily.  Dispense: 30 tablet; Refill: 11  -     escitalopram (Lexapro) 10 MG tablet; Take 1 tablet by mouth Daily.  Dispense: 30 tablet; Refill: 3  -     fluticasone (Flonase) 50 MCG/ACT nasal spray; 2 sprays into the nostril(s) as directed by provider Daily.  Dispense: 18.2 mL; Refill: 11  -     omeprazole (priLOSEC) 40 MG capsule; Take 1 capsule by mouth Daily.  Dispense: 30 capsule; Refill: 11    Other orders  -     Cancel: DULoxetine (CYMBALTA) 30 MG capsule; Take 1 capsule by mouth Daily.  Dispense: 30 capsule; Refill: 12        1. Discontinue Cymbalta and switch to Lexapro 10mg daily. Anxiety seems better now that graduated from residency. Continue to work on marital relations, date night, encourage communication with daily living irritants. Hopes to improve premature ejaculation issue with medication change. Will plan on JACOB-7 at next visit. Lab work reviewed at this visit. Encouraged daily exercise and dietary modification.    2.  Patient understood and acknowledged education, counseling, medication/treatment benefits and side effects. Risks, complications, and expectations of outcomes were also addressed, discussed, and acknowledged. All questions were answered and addressed.     · Rx changes: As above.  · Patient Education: As above.  · Compliance at present is estimated to be excellent.   · Efforts to improve compliance (if necessary) will be directed at Continued physician-patient relationship.    Depression screening: Up to date; last screen 3/25/2021 PHQ-9 Total Score: 7     Follow-up:     Return in about 1 week (around 4/1/2021) for Recheck Lexapro.    FOLLOW UP: (Lexapro, anxiety,  Tdap booster)    Preventative:  Health Maintenance   Topic Date Due   • TDAP/TD VACCINES (1 - Tdap) Never done   • ANNUAL PHYSICAL  08/24/2020   • HEPATITIS C SCREENING  Completed   • INFLUENZA VACCINE  Completed   • Pneumococcal Vaccine 0-64  Aged Out   • MENINGOCOCCAL VACCINE  Aged Out     Male Preventative: Cholesterol screening up to date  Recommended: none  Vaccine Counseling: N/A  Interest in Covid Vaccine: Not discussed    Weight  -Class: Overweight: 25.0-29.9kg/m2   -Patient's Body mass index is 28.69 kg/m². BMI is above normal parameters. Recommendations include: exercise counseling, nutrition counseling and referral to primary care.   eat more fruits and vegetables, decrease soda or juice intake, increase water intake, increase physical activity, reduce portion size, cut out extra servings, reduce fast food intake, plan meals, and have 3 meals a day    Alcohol use:  reports current alcohol use.  Nicotine status  reports that he has never smoked. He has never used smokeless tobacco.    Goals    None         RISK SCORE: 1      This document has been electronically signed by Unique Cabrera MD on March 25, 2021 13:06 CDT

## 2021-05-20 PROCEDURE — 87635 SARS-COV-2 COVID-19 AMP PRB: CPT | Performed by: NURSE PRACTITIONER

## 2021-07-30 ENCOUNTER — OFFICE VISIT (OUTPATIENT)
Dept: FAMILY MEDICINE CLINIC | Facility: CLINIC | Age: 36
End: 2021-07-30

## 2021-07-30 VITALS
WEIGHT: 213.9 LBS | BODY MASS INDEX: 29.94 KG/M2 | HEIGHT: 71 IN | DIASTOLIC BLOOD PRESSURE: 86 MMHG | OXYGEN SATURATION: 98 % | SYSTOLIC BLOOD PRESSURE: 132 MMHG | HEART RATE: 86 BPM

## 2021-07-30 DIAGNOSIS — E66.09 CLASS 1 OBESITY DUE TO EXCESS CALORIES WITHOUT SERIOUS COMORBIDITY WITH BODY MASS INDEX (BMI) OF 30.0 TO 30.9 IN ADULT: ICD-10-CM

## 2021-07-30 DIAGNOSIS — H91.8X1 OTHER HEARING LOSS OF RIGHT EAR WITH UNRESTRICTED HEARING OF LEFT EAR: ICD-10-CM

## 2021-07-30 DIAGNOSIS — J30.89 SEASONAL ALLERGIC RHINITIS DUE TO OTHER ALLERGIC TRIGGER: ICD-10-CM

## 2021-07-30 DIAGNOSIS — L21.0 SEBORRHEA CAPITIS IN ADULT: Primary | ICD-10-CM

## 2021-07-30 DIAGNOSIS — H65.02 NON-RECURRENT ACUTE SEROUS OTITIS MEDIA OF LEFT EAR: ICD-10-CM

## 2021-07-30 PROCEDURE — 99213 OFFICE O/P EST LOW 20 MIN: CPT | Performed by: STUDENT IN AN ORGANIZED HEALTH CARE EDUCATION/TRAINING PROGRAM

## 2021-07-30 RX ORDER — TRIAMCINOLONE ACETONIDE 55 UG/1
2 SPRAY, METERED NASAL DAILY
Qty: 10.8 ML | Refills: 11 | Status: SHIPPED | OUTPATIENT
Start: 2021-07-30 | End: 2022-07-30

## 2021-07-30 RX ORDER — KETOCONAZOLE 20 MG/ML
SHAMPOO TOPICAL 2 TIMES WEEKLY
Qty: 120 ML | Refills: 6 | Status: SHIPPED | OUTPATIENT
Start: 2021-08-02 | End: 2022-08-17 | Stop reason: SDUPTHER

## 2021-07-30 RX ORDER — SEMAGLUTIDE 0.25 MG/.5ML
0.25 INJECTION, SOLUTION SUBCUTANEOUS WEEKLY
Qty: 2 ML | Refills: 0 | Status: SHIPPED | OUTPATIENT
Start: 2021-07-30 | End: 2022-01-21

## 2021-07-30 NOTE — PROGRESS NOTES
Family Medicine Residency  Nella Mariscal MD    Subjective:     Wang Edwards is a 36 y.o. male who presents for feeling pressure, and sharp pain in the left ear. Patient used a Q-tip to clean his left ear two days ago, when he noticed orangish, blood plasma color wax. He denies any visible blood, or similar problem in the right hear.  He endorses progressive difficulty hearing on the left. He has hearing impairment on the right due to head trauma 13 years ago. He also complains of dryness, pruritus, desquamation, and dandruff on his forehead, chin and scalp. He has tried Cotar shampoo every other day with no relief of symptoms. Patient is tolerating lexapro very well, his PHQ-9 is 1, and JACOB-7 score is 5. Patient is overweight and interested in medication to help him lose weight.     The following portions of the patient's history were reviewed and updated as appropriate: past family history, past social history, past surgical history and problem list.    Past Medical Hx:  Past Medical History:   Diagnosis Date   • Anxiety    • Plantar fasciitis    • Verruca        Past Surgical Hx:  History reviewed. No pertinent surgical history.    Current Meds:    Current Outpatient Medications:   •  escitalopram (Lexapro) 10 MG tablet, Take 1 tablet by mouth Daily., Disp: 30 tablet, Rfl: 3  •  omeprazole (priLOSEC) 40 MG capsule, Take 1 capsule by mouth Daily., Disp: 30 capsule, Rfl: 11  •  albuterol sulfate  (90 Base) MCG/ACT inhaler, Inhale 2 puffs Every 4 (Four) Hours As Needed for Wheezing., Disp: 18 g, Rfl: 11  •  cetirizine (zyrTEC) 10 MG tablet, Take 1 tablet by mouth Daily., Disp: 30 tablet, Rfl: 11  •  fluticasone (Flonase) 50 MCG/ACT nasal spray, 2 sprays into the nostril(s) as directed by provider Daily., Disp: 18.2 mL, Rfl: 11  •  [START ON 8/2/2021] ketoconazole (Nizoral) 2 % shampoo, Apply  topically to the appropriate area as directed 2 (Two) Times a Week., Disp: 120 mL, Rfl: 6  •   "Semaglutide-Weight Management (semaglutide) 0.25 MG/0.5ML solution auto-injector, Inject 0.25 mg under the skin into the appropriate area as directed 1 (One) Time Per Week., Disp: 2 mL, Rfl: 0  •  Triamcinolone Acetonide (Nasacort Allergy 24HR) 55 MCG/ACT nasal inhaler, Instill 2 sprays into the nostril(s) as directed by provider Daily., Disp: 10.8 mL, Rfl: 11    Allergies:  No Known Allergies    Family Hx:  Family History   Problem Relation Age of Onset   • No Known Problems Mother    • No Known Problems Father    • No Known Problems Sister    • No Known Problems Brother    • No Known Problems Daughter    • No Known Problems Son    • No Known Problems Maternal Aunt    • No Known Problems Maternal Uncle    • No Known Problems Paternal Aunt    • No Known Problems Paternal Uncle    • No Known Problems Maternal Grandmother    • No Known Problems Maternal Grandfather    • No Known Problems Paternal Grandmother    • No Known Problems Paternal Grandfather         Social History:  Social History     Socioeconomic History   • Marital status:      Spouse name: Not on file   • Number of children: Not on file   • Years of education: Not on file   • Highest education level: Not on file   Tobacco Use   • Smoking status: Never Smoker   • Smokeless tobacco: Never Used   Substance and Sexual Activity   • Alcohol use: Yes   • Drug use: No   • Sexual activity: Yes     Partners: Female       Review of Systems  Review of Systems   Constitutional: Negative.    HENT: Negative.    Cardiovascular: Negative.    Neurological: Negative.    Psychiatric/Behavioral: Negative.        Objective:     /86   Pulse 86   Ht 180.3 cm (71\")   Wt 97 kg (213 lb 14.4 oz)   SpO2 98%   BMI 29.83 kg/m²   Physical Exam  Constitutional:       Appearance: He is obese.   HENT:      Head: Normocephalic and atraumatic.      Right Ear: No drainage, swelling or tenderness. There is no impacted cerumen. No foreign body. No hemotympanum. Tympanic " membrane is not perforated or erythematous.      Left Ear: Decreased hearing noted. No drainage, swelling or tenderness. There is no impacted cerumen. No foreign body. No hemotympanum. Tympanic membrane is not perforated or erythematous.      Ears:      Comments: Effusion behind left tympanic membrane. Hearing impaired on the right, secondary to head trauma.  Skin:            Comments: There is a desquamation, dandruff and pruritis of skin on the forehead, chin, and sculp.    Neurological:      Mental Status: He is alert.   Psychiatric:         Mood and Affect: Mood normal.         Behavior: Behavior normal.          Assessment/Plan:     Diagnoses and all orders for this visit:    1. Other atopic dermatitis (Primary)  -     ketoconazole (Nizoral) 2 % shampoo; Apply  topically to the appropriate area as directed 2 (Two) Times a Week.  Dispense: 120 mL; Refill: 6    2. Seasonal allergic rhinitis due to other allergic trigger  -     Triamcinolone Acetonide (Nasacort Allergy 24HR) 55 MCG/ACT nasal inhaler; Instill 2 sprays into the nostril(s) as directed by provider Daily.  Dispense: 10.8 mL; Refill: 11    3. Non-recurrent acute serous otitis media of left ear  -     Triamcinolone Acetonide (Nasacort Allergy 24HR) 55 MCG/ACT nasal inhaler; Instill 2 sprays into the nostril(s) as directed by provider Daily.  Dispense: 10.8 mL; Refill: 11    4. Other hearing loss of right ear with unrestricted hearing of left ear  -     Ambulatory Referral to ENT (Otolaryngology)    5. Morbid (severe) obesity due to excess calories (CMS/[facility])  -     Semaglutide-Weight Management (semaglutide) 0.25 MG/0.5ML solution auto-injector; Inject 0.25 mg under the skin into the appropriate area as directed 1 (One) Time Per Week.  Dispense: 2 mL; Refill: 0        Rx changes: added ketoconazole (Nizoral) 2 % shampoo, Triamcinolone Acetonide (Nasacort Allergy 24HR) 55 MCG/ACT nasal inhaler, and wegovy   · Patient Education: weight reduction management  with medication.   · Compliance at present is estimated to be excellent.   · Efforts to improve compliance (if necessary) will be directed at dietary modifications: more fruit and vegetables and increased exercise.    Depression screening: Depression screening performed today; result negative; no follow up needed     Follow-up:     Return in about 4 weeks (around 8/27/2021) for Recheck.    Preventative:  Health Maintenance   Topic Date Due   • COVID-19 Vaccine (1) Never done   • TDAP/TD VACCINES (1 - Tdap) Never done   • INFLUENZA VACCINE  10/01/2021   • ANNUAL PHYSICAL  03/26/2022   • HEPATITIS C SCREENING  Completed   • Pneumococcal Vaccine 0-64  Aged Out     Male Preventative: Exercises regularly  Recommended: none  Vaccine Counseling: N/A    Weight  -Class: Overweight: 25.0-29.9kg/m2   -Patient's Body mass index is 29.83 kg/m². indicating that he is overweight (BMI 25-29.9). Obesity-related health conditions include the following: obstructive sleep apnea, hypertension, coronary heart disease, diabetes mellitus, dyslipidemias, GERD, peripheral vascular disease and peripheral vascular disease. Obesity is worsening. BMI is is above average; BMI management plan is completed. We discussed portion control and increasing exercise..   eat more fruits and vegetables and plan meals    Alcohol use:  reports current alcohol use.  Nicotine status  reports that he has never smoked. He has never used smokeless tobacco.    Goals    None         RISK SCORE: 3      This document has been electronically signed by Nella Mariscal MD on July 30, 2021 16:35 CDT

## 2021-08-04 DIAGNOSIS — F32.A ANXIETY AND DEPRESSION: ICD-10-CM

## 2021-08-04 DIAGNOSIS — F41.9 ANXIETY AND DEPRESSION: ICD-10-CM

## 2021-08-04 RX ORDER — OMEPRAZOLE 40 MG/1
40 CAPSULE, DELAYED RELEASE ORAL DAILY
Qty: 30 CAPSULE | Refills: 11 | Status: SHIPPED | OUTPATIENT
Start: 2021-08-04 | End: 2022-05-10 | Stop reason: SDUPTHER

## 2021-08-04 RX ORDER — ESCITALOPRAM OXALATE 10 MG/1
10 TABLET ORAL DAILY
Qty: 30 TABLET | Refills: 3 | Status: SHIPPED | OUTPATIENT
Start: 2021-08-04 | End: 2022-04-26 | Stop reason: SDUPTHER

## 2021-09-14 ENCOUNTER — HOSPITAL ENCOUNTER (OUTPATIENT)
Dept: ULTRASOUND IMAGING | Facility: HOSPITAL | Age: 36
Discharge: HOME | End: 2021-09-14

## 2021-09-14 ENCOUNTER — LAB (OUTPATIENT)
Dept: LAB | Facility: HOSPITAL | Age: 36
End: 2021-09-14

## 2021-09-14 DIAGNOSIS — M79.662 PAIN OF LEFT CALF: ICD-10-CM

## 2021-09-14 DIAGNOSIS — I82.432 ACUTE DEEP VEIN THROMBOSIS (DVT) OF POPLITEAL VEIN OF LEFT LOWER EXTREMITY (HCC): Primary | ICD-10-CM

## 2021-09-14 DIAGNOSIS — I82.432 ACUTE DEEP VEIN THROMBOSIS (DVT) OF POPLITEAL VEIN OF LEFT LOWER EXTREMITY (HCC): ICD-10-CM

## 2021-09-14 DIAGNOSIS — M79.662 PAIN OF LEFT CALF: Primary | ICD-10-CM

## 2021-09-14 PROCEDURE — 85301 ANTITHROMBIN III ANTIGEN: CPT

## 2021-09-14 PROCEDURE — 85597 PHOSPHOLIPID PLTLT NEUTRALIZ: CPT

## 2021-09-14 PROCEDURE — 36415 COLL VENOUS BLD VENIPUNCTURE: CPT

## 2021-09-14 PROCEDURE — 86146 BETA-2 GLYCOPROTEIN ANTIBODY: CPT

## 2021-09-14 PROCEDURE — 85610 PROTHROMBIN TIME: CPT

## 2021-09-14 PROCEDURE — 85613 RUSSELL VIPER VENOM DILUTED: CPT

## 2021-09-14 PROCEDURE — 81241 F5 GENE: CPT

## 2021-09-14 PROCEDURE — 85730 THROMBOPLASTIN TIME PARTIAL: CPT

## 2021-09-14 PROCEDURE — 85732 THROMBOPLASTIN TIME PARTIAL: CPT

## 2021-09-14 PROCEDURE — 85300 ANTITHROMBIN III ACTIVITY: CPT

## 2021-09-14 PROCEDURE — 85598 HEXAGNAL PHOSPH PLTLT NEUTRL: CPT

## 2021-09-14 PROCEDURE — 86147 CARDIOLIPIN ANTIBODY EA IG: CPT

## 2021-09-14 PROCEDURE — 85670 THROMBIN TIME PLASMA: CPT

## 2021-09-14 PROCEDURE — 93971 EXTREMITY STUDY: CPT

## 2021-09-14 RX ORDER — RIVAROXABAN 15 MG-20MG
KIT ORAL
Qty: 51 EACH | Refills: 0 | Status: SHIPPED | OUTPATIENT
Start: 2021-09-14 | End: 2022-01-21

## 2021-09-14 NOTE — PROGRESS NOTES
Patient with unprovoked DVT. No history of prior. Dad has multiple DVTs.    Referral to heme/onc for eval. Labs ordered for hypercoagulable studies.      This document has been electronically signed by Isabelle Martinez MD on September 14, 2021 16:23 CDT

## 2021-09-14 NOTE — PROGRESS NOTES
Patient c/o left calf pain since waking up Friday am. He is unable to bear weight comfortably. Pain has gotten worse since then and while sitting, will get shooting pain from back of calf to ankle.    Pain with dorsiflex and plantarflex. + Uche's.    Ordered STAT left duplex venous US.      This document has been electronically signed by Isabelle Martinez MD on September 14, 2021 08:22 CDT

## 2021-09-15 ENCOUNTER — TELEPHONE (OUTPATIENT)
Dept: ONCOLOGY | Facility: CLINIC | Age: 36
End: 2021-09-15

## 2021-09-15 LAB — F5 GENE MUT ANL BLD/T: ABNORMAL

## 2021-09-15 NOTE — TELEPHONE ENCOUNTER
Spoke with patient.  Gave appt date and time of 09-24-21 @ 2:15 for registration. Also mailed information.

## 2021-09-17 LAB
AT III ACT/NOR PPP CHRO: 105 % (ref 75–135)
AT III AG ACT/NOR PPP IA: 83 % (ref 72–124)

## 2021-09-19 LAB
APTT HEX PL PPP: 0 SEC (ref 0–11)
APTT PPP: 24.8 SEC (ref 22.9–30.2)
B2 GLYCOPROT1 IGG SER-ACNC: <9 GPI IGG UNITS (ref 0–20)
B2 GLYCOPROT1 IGM SER-ACNC: <9 GPI IGM UNITS (ref 0–32)
CARDIOLIPIN IGG SER IA-ACNC: <9 GPL U/ML (ref 0–14)
CARDIOLIPIN IGM SER IA-ACNC: 11 MPL U/ML (ref 0–12)
INR PPP: 1 (ref 0.9–1.2)
PATH INTERP BLD-IMP: NORMAL
PATH INTERP SPEC-IMP: NORMAL
PROTHROMBIN TIME: 10.3 SEC (ref 9.1–12)
SCREEN DRVVT: 39.7 SEC (ref 0–47)
THROMBIN TIME: 16.1 SEC (ref 0–23)

## 2021-09-24 ENCOUNTER — CONSULT (OUTPATIENT)
Dept: ONCOLOGY | Facility: CLINIC | Age: 36
End: 2021-09-24

## 2021-09-24 VITALS
DIASTOLIC BLOOD PRESSURE: 82 MMHG | BODY MASS INDEX: 29.89 KG/M2 | SYSTOLIC BLOOD PRESSURE: 150 MMHG | WEIGHT: 214.3 LBS | RESPIRATION RATE: 18 BRPM | HEART RATE: 97 BPM | TEMPERATURE: 96 F | OXYGEN SATURATION: 97 %

## 2021-09-24 DIAGNOSIS — I82.432 ACUTE DEEP VEIN THROMBOSIS (DVT) OF POPLITEAL VEIN OF LEFT LOWER EXTREMITY (HCC): Primary | ICD-10-CM

## 2021-09-24 DIAGNOSIS — D68.51 HETEROZYGOUS FACTOR V LEIDEN MUTATION (HCC): ICD-10-CM

## 2021-09-24 PROCEDURE — 99204 OFFICE O/P NEW MOD 45 MIN: CPT | Performed by: INTERNAL MEDICINE

## 2021-09-24 PROCEDURE — G0463 HOSPITAL OUTPT CLINIC VISIT: HCPCS | Performed by: INTERNAL MEDICINE

## 2021-09-27 NOTE — PROGRESS NOTES
REASON FOR CONSULTATION:  DVT   Provide an opinion on any further workup or treatment                             REQUESTING PHYSICIAN:  Unique Cabrera MD      RECORDS OBTAINED:  Records of the patients history including those obtained from the referring provider were reviewed and summarized in detail.    History of Present Illness     This is a pleasant 36-year-old male who was seen in consultation at the request of Unique Cabrera MD for evaluation of deep vein thrombosis.  Patient developed left calf pain and swelling all of a sudden on September 13, 2021.  He denies any injury or trauma.  No recent medical or surgical hospital admission.  No recent surgery.  No other systemic autoimmune disease.  Patient subsequently had ultrasound venous Doppler studies performed as outpatient on September 14, 2021 which showed thrombus in the left popliteal vein and in deep veins of the left calf.  Patient subsequently started on Xarelto.  His symptoms have improved somewhat since then.  He has not had any side effects from Xarelto.  Patient does report positive family history of VTE in father.  Positive family history of stroke in maternal grandmother.  Been asked to assist with evaluation and management of his left leg DVT.          Past Medical History:   Diagnosis Date   • Anxiety    • Plantar fasciitis    • Verruca         History reviewed. No pertinent surgical history.     Current Outpatient Medications on File Prior to Visit   Medication Sig Dispense Refill   • albuterol sulfate  (90 Base) MCG/ACT inhaler Inhale 2 puffs Every 4 (Four) Hours As Needed for Wheezing. 18 g 11   • cetirizine (zyrTEC) 10 MG tablet Take 1 tablet by mouth Daily. 30 tablet 11   • escitalopram (Lexapro) 10 MG tablet Take 1 tablet by mouth Daily. 30 tablet 3   • fluticasone (Flonase) 50 MCG/ACT nasal spray 2 sprays into the nostril(s) as directed by provider Daily. 18.2 mL 11   • ketoconazole (Nizoral) 2 % shampoo Apply  topically to  the appropriate area as directed 2 (Two) Times a Week. 120 mL 6   • omeprazole (priLOSEC) 40 MG capsule Take 1 capsule by mouth Daily. 30 capsule 11   • Rivaroxaban (Xarelto Starter Pack) tablet therapy pack starter pack Take one 15 mg tablet twice daily with food for 21 days.  Followed by one 20 mg tablet by mouth once daily with food as directed. 51 each 0   • Semaglutide-Weight Management (semaglutide) 0.25 MG/0.5ML solution auto-injector Inject 0.25 mg under the skin into the appropriate area as directed 1 (One) Time Per Week. 2 mL 0   • Triamcinolone Acetonide (Nasacort Allergy 24HR) 55 MCG/ACT nasal inhaler Instill 2 sprays into the nostril(s) as directed by provider Daily. 10.8 mL 11     No current facility-administered medications on file prior to visit.        ALLERGIES:  No Known Allergies     Social History     Socioeconomic History   • Marital status:      Spouse name: Not on file   • Number of children: Not on file   • Years of education: Not on file   • Highest education level: Not on file   Tobacco Use   • Smoking status: Never Smoker   • Smokeless tobacco: Never Used   Substance and Sexual Activity   • Alcohol use: Yes   • Drug use: No   • Sexual activity: Yes     Partners: Female        Family History   Problem Relation Age of Onset   • No Known Problems Mother    • No Known Problems Father    • No Known Problems Sister    • No Known Problems Brother    • No Known Problems Daughter    • No Known Problems Son    • No Known Problems Maternal Aunt    • No Known Problems Maternal Uncle    • No Known Problems Paternal Aunt    • No Known Problems Paternal Uncle    • No Known Problems Maternal Grandmother    • No Known Problems Maternal Grandfather    • No Known Problems Paternal Grandmother    • No Known Problems Paternal Grandfather             Objective     Vitals:    09/24/21 1431   BP: 150/82   Pulse: 97   Resp: 18   Temp: 96 °F (35.6 °C)   SpO2: 97%   Weight: 97.2 kg (214 lb 4.8 oz)   PainSc:    1     No flowsheet data found.    Physical Exam  Vitals and nursing note reviewed.   Constitutional:       Appearance: Normal appearance.   Neurological:      General: No focal deficit present.      Mental Status: He is alert and oriented to person, place, and time. Mental status is at baseline.   Psychiatric:         Mood and Affect: Mood normal.         Behavior: Behavior normal.         Thought Content: Thought content normal.               RECENT LABS:Independently reviewed and summarized  Hematology WBC   Date Value Ref Range Status   03/23/2021 7.75 3.40 - 10.80 10*3/mm3 Final     RBC   Date Value Ref Range Status   03/23/2021 4.89 4.14 - 5.80 10*6/mm3 Final     Hemoglobin   Date Value Ref Range Status   03/23/2021 14.9 13.0 - 17.7 g/dL Final     Hematocrit   Date Value Ref Range Status   03/23/2021 42.4 37.5 - 51.0 % Final     Platelets   Date Value Ref Range Status   03/23/2021 203 140 - 450 10*3/mm3 Final          Lab Results   Component Value Date    GLUCOSE 109 (H) 03/23/2021    BUN 14 03/23/2021    CREATININE 1.21 03/23/2021    EGFRIFNONA 69 03/23/2021    BCR 11.6 03/23/2021    K 4.4 03/23/2021    CO2 28.7 03/23/2021    CALCIUM 9.3 03/23/2021    ALBUMIN 4.40 03/23/2021    AST 23 03/23/2021    ALT 21 03/23/2021         US Venous Doppler Lower Extremity Left (duplex)    Result Date: 9/14/2021  CONCLUSION: Abnormal exam, positive for DVT in the left popliteal vein and left calf veins. Critical results discussed with ELIJAH ABURTO on 9/14/2021 2:55 PM CDT Electronically signed by:  Edu Escamilla MD  9/14/2021 2:56 PM CDT Workstation: [id number]      I have reviewed old records and summarized them in HPI as well as assessment and plan section of this note.       Wang Edwards reports a pain score of 1.  Given his pain assessment as noted, treatment options were discussed and the following options were decided upon as a follow-up plan to address the patient's pain: continuation of current treatment  plan for pain.    Patient screened positive for depression based on a PHQ-9 score of 0 on 9/24/2021. Follow-up recommendations include: Suicide Risk Assessment performed.    Diagnosis:   (1) Acute left popliteal DVT   (2) Factor V leiden heterozygous     All are new diagnosis/problems for me.      Assessment/Plan       Patient with new diagnosis of unprovoked left popliteal DVT.    Old records, venous Doppler ultrasound results, primary care provider's notes were reviewed at length.    Discussed diagnosis, prognosis, natural history as well as treatment options for VTE at length with the patient.    Patients with unprovoked VTE have a significantly increased risk of recurrence, as compared with patients who have provoked VTE, with roughly a 10% risk in the first year after anticoagulant therapy is stopped and with a cumulative risk of 40% at 5 years and more than 50% at 10 years. The rationale for extending anticoagulation indefinitely is based upon long-term epidemiologic studies of recurrence risk after cessation of a conventional course of anticoagulation as well as randomized trials and meta-analyses that suggest that anticoagulation for prolonged periods successfully reduces the rate of VTE recurrence. Most studies are inadequately powered to assess mortality. Cumulatively, data suggest that the decrease in VTE recurrence seen with prolonged periods of anticoagulation is achieved at the expense of an increased risk of bleeding. Thus, indefinite anticoagulation is most likely to benefit those with an increased risk of recurrence in whom the risk of bleeding is low. His risk of bleeding risk based on HAS BLED score is low. At this point I recommend long - term anticoagulation over short team anticoagulation. However, his bleeding risk should be re-evaluated on annual basis.      He was tested for various thrombophilia by his primary care provider and was noticed to have factor V Leiden heterozygous mutation  status.  Discussed with patient that this really does not change recommendation with regard to how long to anticoagulate.  Family testing should be considered on a case-by-case basis.  Regardless, I recommend all of his family members be aware of family history of VTE and consider DVT prophylaxis in high risk situations such as hospital or medical admissions, long travel,periods of immobilization and avoid estrogen containing contraceptive pills.  Patient understood and was agreeable with our recommendations.    Recommendations:   · Recommend long-term anticoagulation given high risk of recurrence with unprovoked VTE.  Is currently on Xarelto and is tolerating it well.  · Risk versus benefit of Xarelto versus Coumadin discussed.  Patient wants to continue Xarelto for now.  · Family testing discussed at length.  Recommendations provided.  · Check CBC, CMP twice a year.  · Evaluate risk of bleeding twice a year.      I spent 50 minutes caring for Wang on this date of service. This time includes time spent by me in the following activities: preparing for the visit, reviewing tests, obtaining and/or reviewing a separately obtained history, performing a medically appropriate examination and/or evaluation, counseling and educating the patient/family/caregiver, ordering medications, tests, or procedures, referring and communicating with other health care professionals, documenting information in the medical record, independently interpreting results and communicating that information with the patient/family/caregiver and care coordination

## 2021-09-29 LAB
APTT HEX PL PPP: 1 SEC
APTT IMM NP PPP: NORMAL S
APTT PPP 1:1 SALINE: NORMAL S
APTT PPP: 25.4 SEC
B2 GLYCOPROT1 IGA SER-ACNC: <10 SAU
B2 GLYCOPROT1 IGG SER-ACNC: <10 SGU
B2 GLYCOPROT1 IGM SER-ACNC: <10 SMU
CARDIOLIPIN IGG SER IA-ACNC: <10 GPL
CARDIOLIPIN IGM SER IA-ACNC: <10 MPL
CONFIRM APTT: 1 SEC
CONFIRM DRVVT: NORMAL S
DRVVT SCREEN TO CONFIRM RATIO: NORMAL {RATIO}
INR PPP: 0.9 RATIO
LABORATORY COMMENT REPORT: NORMAL
PROTHROMBIN TIME: 10 SEC
SCREEN DRVVT: 42.8 SEC
THROMBIN TIME: 15.1 SEC

## 2021-10-14 ENCOUNTER — PROCEDURE VISIT (OUTPATIENT)
Dept: FAMILY MEDICINE CLINIC | Facility: CLINIC | Age: 36
End: 2021-10-14

## 2021-10-14 VITALS
HEIGHT: 71 IN | SYSTOLIC BLOOD PRESSURE: 130 MMHG | WEIGHT: 212.4 LBS | TEMPERATURE: 97.5 F | OXYGEN SATURATION: 99 % | HEART RATE: 77 BPM | DIASTOLIC BLOOD PRESSURE: 80 MMHG | BODY MASS INDEX: 29.73 KG/M2

## 2021-10-14 DIAGNOSIS — M25.562 ACUTE PAIN OF LEFT KNEE: Primary | ICD-10-CM

## 2021-10-14 DIAGNOSIS — D68.51 HETEROZYGOUS FACTOR V LEIDEN MUTATION (HCC): ICD-10-CM

## 2021-10-14 PROCEDURE — 99213 OFFICE O/P EST LOW 20 MIN: CPT | Performed by: STUDENT IN AN ORGANIZED HEALTH CARE EDUCATION/TRAINING PROGRAM

## 2021-10-14 PROCEDURE — 20610 DRAIN/INJ JOINT/BURSA W/O US: CPT | Performed by: STUDENT IN AN ORGANIZED HEALTH CARE EDUCATION/TRAINING PROGRAM

## 2021-10-14 RX ORDER — TRIAMCINOLONE ACETONIDE 40 MG/ML
40 INJECTION, SUSPENSION INTRA-ARTICULAR; INTRAMUSCULAR ONCE
Status: SHIPPED | OUTPATIENT
Start: 2021-10-14

## 2021-10-14 NOTE — PROGRESS NOTES
"  Family Medicine Residency  Ruchi Wilkins MD    PROCEDURE NOTE    Wang Edwards 36 y.o. male here for left knee pain. Patient reports the pain started about a month ago. Patient states that he has been putting more pressure on the left leg for the past month. He was having left calf pain and swelling, and later was diagnosed with a DVT on the popliteal vein. Patient was diagnosed with factor V leiden mutation, and is currently taking Xarelto.     PMHx, Surgical hx, FHx, Social history, Allergies and meds reviewed and updated as appropriate.     /80   Pulse 77   Temp 97.5 °F (36.4 °C)   Ht 180.3 cm (71\")   Wt 96.3 kg (212 lb 6.4 oz)   SpO2 99%   BMI 29.62 kg/m²     Procedures    Diagnoses and all orders for this visit:    1. Acute pain of left knee (Primary)  -     triamcinolone acetonide (KENALOG-40) injection 40 mg    2. Heterozygous factor V Leiden mutation (HCC)  -     rivaroxaban (Xarelto) 20 MG tablet; Take 1 tablet by mouth Daily for 180 days.  Dispense: 90 tablet; Refill: 1        Return if symptoms worsen or fail to improve.    Ruchi Wilknis MD  10/14/21  10:54 CDT            "

## 2021-10-25 DIAGNOSIS — J30.89 SEASONAL ALLERGIC RHINITIS DUE TO OTHER ALLERGIC TRIGGER: Primary | ICD-10-CM

## 2021-10-25 RX ORDER — MONTELUKAST SODIUM 10 MG/1
10 TABLET ORAL NIGHTLY
Qty: 30 TABLET | Refills: 11 | Status: SHIPPED | OUTPATIENT
Start: 2021-10-25 | End: 2022-08-31 | Stop reason: SDUPTHER

## 2022-01-21 ENCOUNTER — LAB (OUTPATIENT)
Dept: LAB | Facility: HOSPITAL | Age: 37
End: 2022-01-21

## 2022-01-21 ENCOUNTER — OFFICE VISIT (OUTPATIENT)
Dept: FAMILY MEDICINE CLINIC | Facility: CLINIC | Age: 37
End: 2022-01-21

## 2022-01-21 VITALS
TEMPERATURE: 97.8 F | BODY MASS INDEX: 29.62 KG/M2 | OXYGEN SATURATION: 98 % | HEIGHT: 71 IN | DIASTOLIC BLOOD PRESSURE: 78 MMHG | SYSTOLIC BLOOD PRESSURE: 116 MMHG | HEART RATE: 91 BPM

## 2022-01-21 DIAGNOSIS — N32.9 URINARY BLADDER DISORDER: Primary | ICD-10-CM

## 2022-01-21 DIAGNOSIS — N32.9 URINARY BLADDER DISORDER: ICD-10-CM

## 2022-01-21 DIAGNOSIS — Z12.5 SCREENING PSA (PROSTATE SPECIFIC ANTIGEN): ICD-10-CM

## 2022-01-21 PROCEDURE — 81003 URINALYSIS AUTO W/O SCOPE: CPT

## 2022-01-21 PROCEDURE — 99213 OFFICE O/P EST LOW 20 MIN: CPT | Performed by: STUDENT IN AN ORGANIZED HEALTH CARE EDUCATION/TRAINING PROGRAM

## 2022-01-21 RX ORDER — TAMSULOSIN HYDROCHLORIDE 0.4 MG/1
1 CAPSULE ORAL DAILY
Qty: 30 CAPSULE | Refills: 1 | Status: SHIPPED | OUTPATIENT
Start: 2022-01-21

## 2022-01-21 NOTE — PROGRESS NOTES
Family Medicine Residency  Unique Cabrera MD    Subjective:     Wang Edwards is a 36 y.o. male who presents for urinary issues.    Urinary issues  States for the last few weeks has noticed increased urinary urgency, frequency and trouble initiating stream and emptying bladder, only at night time. Denies any fevers/chills, or back pain, or dysuria.     The following portions of the patient's history were reviewed and updated as appropriate: allergies, current medications, past family history, past medical history, past social history, past surgical history and problem list.    Past Medical Hx:  Past Medical History:   Diagnosis Date   • Anxiety    • Plantar fasciitis    • Verruca        Past Surgical Hx:  History reviewed. No pertinent surgical history.    Current Meds:    Current Outpatient Medications:   •  albuterol sulfate  (90 Base) MCG/ACT inhaler, Inhale 2 puffs Every 4 (Four) Hours As Needed for Wheezing., Disp: 18 g, Rfl: 11  •  cetirizine (zyrTEC) 10 MG tablet, Take 1 tablet by mouth Daily., Disp: 30 tablet, Rfl: 11  •  clobetasol (TEMOVATE) 0.05 % cream, Apply topically to the appropriate area as directed 2 (Two) Times a Day., Disp: 30 g, Rfl: 1  •  escitalopram (Lexapro) 10 MG tablet, Take 1 tablet by mouth Daily., Disp: 30 tablet, Rfl: 3  •  fluticasone (Flonase) 50 MCG/ACT nasal spray, 2 sprays into the nostril(s) as directed by provider Daily., Disp: 18.2 mL, Rfl: 11  •  ketoconazole (Nizoral) 2 % shampoo, Apply  topically to the appropriate area as directed 2 (Two) Times a Week., Disp: 120 mL, Rfl: 6  •  montelukast (Singulair) 10 MG tablet, Take 1 tablet by mouth Every Night., Disp: 30 tablet, Rfl: 11  •  omeprazole (priLOSEC) 40 MG capsule, Take 1 capsule by mouth Daily., Disp: 30 capsule, Rfl: 11  •  Rivaroxaban (Xarelto Starter Pack) tablet therapy pack starter pack, Take one 15 mg tablet twice daily with food for 21 days.  Followed by one 20 mg tablet by mouth once daily with food  as directed., Disp: 51 each, Rfl: 0  •  rivaroxaban (Xarelto) 20 MG tablet, Take 1 tablet by mouth Daily for 180 days **Take with food**, Disp: 90 tablet, Rfl: 1  •  Semaglutide-Weight Management (semaglutide) 0.25 MG/0.5ML solution auto-injector, Inject 0.25 mg under the skin into the appropriate area as directed 1 (One) Time Per Week., Disp: 2 mL, Rfl: 0  •  Triamcinolone Acetonide (Nasacort Allergy 24HR) 55 MCG/ACT nasal inhaler, Instill 2 sprays into the nostril(s) as directed by provider Daily., Disp: 10.8 mL, Rfl: 11    Current Facility-Administered Medications:   •  triamcinolone acetonide (KENALOG-40) injection 40 mg, 40 mg, Intra-articular, Once, Ruchi Wilkins MD    Allergies:  No Known Allergies    Family Hx:  Family History   Problem Relation Age of Onset   • No Known Problems Mother    • No Known Problems Father    • No Known Problems Sister    • No Known Problems Brother    • No Known Problems Daughter    • No Known Problems Son    • No Known Problems Maternal Aunt    • No Known Problems Maternal Uncle    • No Known Problems Paternal Aunt    • No Known Problems Paternal Uncle    • No Known Problems Maternal Grandmother    • No Known Problems Maternal Grandfather    • No Known Problems Paternal Grandmother    • No Known Problems Paternal Grandfather         Social History:  Social History     Socioeconomic History   • Marital status:    Tobacco Use   • Smoking status: Never Smoker   • Smokeless tobacco: Never Used   Substance and Sexual Activity   • Alcohol use: Yes   • Drug use: No   • Sexual activity: Yes     Partners: Female       Review of Systems  Review of Systems   Constitutional: Negative for chills and fever.   HENT: Negative for rhinorrhea and sore throat.    Eyes: Negative for photophobia and visual disturbance.   Respiratory: Negative for cough and shortness of breath.    Cardiovascular: Negative for chest pain and palpitations.   Gastrointestinal: Negative for abdominal pain and  "nausea.   Genitourinary: Positive for decreased urine volume, difficulty urinating, frequency and urgency. Negative for dysuria, hematuria, penile discharge, penile pain, penile swelling, scrotal swelling and testicular pain.   Musculoskeletal: Negative for arthralgias and back pain.   Neurological: Negative for weakness, light-headedness and headaches.   Psychiatric/Behavioral: Negative for dysphoric mood and sleep disturbance.   All other systems reviewed and are negative.      Objective:     /78   Pulse 91   Temp 97.8 °F (36.6 °C)   Ht 180.3 cm (71\")   SpO2 98%   BMI 29.62 kg/m²   Physical Exam  Vitals reviewed.   Constitutional:       General: He is not in acute distress.     Appearance: Normal appearance. He is well-developed, well-groomed and overweight.      Interventions: Face mask in place.   HENT:      Head: Normocephalic.      Right Ear: Hearing and external ear normal.      Left Ear: Hearing and external ear normal.      Nose: Nose normal.      Mouth/Throat:      Lips: Pink.      Mouth: Mucous membranes are moist.   Eyes:      General: Lids are normal.      Conjunctiva/sclera: Conjunctivae normal.   Cardiovascular:      Rate and Rhythm: Normal rate and regular rhythm.      Pulses: Normal pulses.      Heart sounds: Normal heart sounds. No murmur heard.  No friction rub. No gallop.    Pulmonary:      Effort: Pulmonary effort is normal.      Breath sounds: Normal breath sounds and air entry. No wheezing, rhonchi or rales.   Abdominal:      General: Bowel sounds are normal.      Palpations: Abdomen is soft.   Skin:     General: Skin is warm.   Neurological:      Mental Status: He is alert and oriented to person, place, and time.   Psychiatric:         Attention and Perception: Attention and perception normal.         Mood and Affect: Mood and affect normal.         Speech: Speech normal.         Behavior: Behavior normal. Behavior is cooperative.         Thought Content: Thought content normal.    "      Cognition and Memory: Cognition and memory normal.         Judgment: Judgment normal.          Assessment/Plan:     Diagnoses and all orders for this visit:    1. Urinary bladder disorder (Primary)  -     Urinalysis With Culture If Indicated -; Future  -     tamsulosin (FLOMAX) 0.4 MG capsule 24 hr capsule; Take 1 capsule by mouth Daily.  Dispense: 30 capsule; Refill: 1    2. Screening PSA (prostate specific antigen)  -     PSA SCREENING; Future      1. Patient is too young for BPH, and no family hx of prostate cancer or prostatic issues. UA x/cx. Start on Tamsulosin 0.4mg daily to see if some relief from symptoms.     2.  PSA, not anticipating a positive screen. Patient declined a LATONIA for prostatic issues. Patient did admit to frequent masturbation under pressure and expressed concerned for this being an increased risk for prostatic issues. There is no evidence of this in literature, however, potential for urethral damage due to frequent masturbation. Discussed with patient and will reassess at follow up.     · Rx changes: As above  · Patient Education: As above  · Compliance at present is estimated to be excellent.   · Efforts to improve compliance (if necessary) will be directed at Continued physician-patient relationship.    Depression screening: Up to date; last screen 9/24/2021      Follow-up:     Return in about 2 weeks (around 2/4/2022) for Recheck.    FOLLOW UP: (LATONIA, urinary symptoms)    Preventative:  Health Maintenance   Topic Date Due   • COVID-19 Vaccine (1) Never done   • TDAP/TD VACCINES (1 - Tdap) Never done   • INFLUENZA VACCINE  08/01/2021   • ANNUAL PHYSICAL  03/26/2022   • HEPATITIS C SCREENING  Completed   • Pneumococcal Vaccine 0-64  Aged Out     Male Preventative: Currently up to date  Recommended: none  Vaccine Counseling: N/A  Interest in Covid Vaccine: The patient qualifies to receive the vaccine, but they have not yet received it.    Weight  -Class: Overweight: 25.0-29.9kg/m2    -Patient's Body mass index is 29.62 kg/m². indicating that he is overweight (BMI 25-29.9). Obesity-related health conditions include the following: obstructive sleep apnea, hypertension, coronary heart disease, diabetes mellitus, dyslipidemias, GERD, peripheral vascular disease, idiopathic intracranial hypertension, osteoarthritis, lower extremity venous stasis disease, urinary stress incontinence and peripheral vascular disease. Obesity is unchanged. BMI is is above average and patient is aware. . We discussed portion control and increasing exercise..   eat more fruits and vegetables, decrease soda or juice intake, increase water intake, increase physical activity, reduce portion size, cut out extra servings, reduce fast food intake, plan meals, and have 3 meals a day    Alcohol use:  reports current alcohol use.  Nicotine status  reports that he has never smoked. He has never used smokeless tobacco.    Goals    None         RISK SCORE: 1      This document has been electronically signed by Unique Cabrera MD on January 21, 2022 11:21 CST

## 2022-01-22 LAB
BILIRUB UR QL STRIP: NEGATIVE
CLARITY UR: CLEAR
COLOR UR: YELLOW
GLUCOSE UR STRIP-MCNC: NEGATIVE MG/DL
HGB UR QL STRIP.AUTO: NEGATIVE
KETONES UR QL STRIP: NEGATIVE
LEUKOCYTE ESTERASE UR QL STRIP.AUTO: NEGATIVE
NITRITE UR QL STRIP: NEGATIVE
PH UR STRIP.AUTO: 6.5 [PH] (ref 5–8)
PROT UR QL STRIP: NEGATIVE
SP GR UR STRIP: 1.01 (ref 1–1.03)
UROBILINOGEN UR QL STRIP: NORMAL

## 2022-01-24 ENCOUNTER — LAB (OUTPATIENT)
Dept: LAB | Facility: HOSPITAL | Age: 37
End: 2022-01-24

## 2022-01-24 DIAGNOSIS — Z12.5 SCREENING PSA (PROSTATE SPECIFIC ANTIGEN): ICD-10-CM

## 2022-01-24 PROCEDURE — 36415 COLL VENOUS BLD VENIPUNCTURE: CPT

## 2022-01-24 PROCEDURE — G0103 PSA SCREENING: HCPCS

## 2022-01-25 LAB — PSA SERPL-MCNC: 0.32 NG/ML (ref 0–4)

## 2022-02-10 ENCOUNTER — OFFICE VISIT (OUTPATIENT)
Dept: FAMILY MEDICINE CLINIC | Facility: CLINIC | Age: 37
End: 2022-02-10

## 2022-02-10 VITALS
DIASTOLIC BLOOD PRESSURE: 76 MMHG | OXYGEN SATURATION: 98 % | TEMPERATURE: 97.3 F | HEIGHT: 71 IN | HEART RATE: 91 BPM | SYSTOLIC BLOOD PRESSURE: 120 MMHG | BODY MASS INDEX: 29.62 KG/M2

## 2022-02-10 DIAGNOSIS — R39.198 URINARY DYSFUNCTION: Primary | ICD-10-CM

## 2022-02-10 PROCEDURE — 99213 OFFICE O/P EST LOW 20 MIN: CPT | Performed by: STUDENT IN AN ORGANIZED HEALTH CARE EDUCATION/TRAINING PROGRAM

## 2022-02-10 NOTE — PROGRESS NOTES
Family Medicine Residency  Unique Cabrera MD    Subjective:     Wang Edwards is a 36 y.o. male who presents for urinary issues.    Urinary issues  Patient presents with continued urinary issues. He used Tamsulosin for about a week and then discontinued because it was not efficacious. UA & PSA where normal. No new medications or supplements added, no dietary changes.     The following portions of the patient's history were reviewed and updated as appropriate: allergies, current medications, past family history, past medical history, past social history, past surgical history and problem list.    Past Medical Hx:  Past Medical History:   Diagnosis Date   • Anxiety    • Plantar fasciitis    • Verruca        Past Surgical Hx:  Past Surgical History:   Procedure Laterality Date   • SKIN BIOPSY  2021       Current Meds:    Current Outpatient Medications:   •  albuterol sulfate  (90 Base) MCG/ACT inhaler, Inhale 2 puffs Every 4 (Four) Hours As Needed for Wheezing., Disp: 18 g, Rfl: 11  •  cetirizine (zyrTEC) 10 MG tablet, Take 1 tablet by mouth Daily., Disp: 30 tablet, Rfl: 11  •  clobetasol (TEMOVATE) 0.05 % cream, Apply topically to the appropriate area as directed 2 (Two) Times a Day., Disp: 30 g, Rfl: 1  •  escitalopram (Lexapro) 10 MG tablet, Take 1 tablet by mouth Daily., Disp: 30 tablet, Rfl: 3  •  fluticasone (Flonase) 50 MCG/ACT nasal spray, 2 sprays into the nostril(s) as directed by provider Daily., Disp: 18.2 mL, Rfl: 11  •  ketoconazole (Nizoral) 2 % shampoo, Apply  topically to the appropriate area as directed 2 (Two) Times a Week., Disp: 120 mL, Rfl: 6  •  montelukast (Singulair) 10 MG tablet, Take 1 tablet by mouth Every Night., Disp: 30 tablet, Rfl: 11  •  omeprazole (priLOSEC) 40 MG capsule, Take 1 capsule by mouth Daily., Disp: 30 capsule, Rfl: 11  •  rivaroxaban (Xarelto) 20 MG tablet, Take 1 tablet by mouth Daily for 180 days **Take with food**, Disp: 90 tablet, Rfl: 1  •   Semaglutide,0.25 or 0.5MG/DOS, (Ozempic, 0.25 or 0.5 MG/DOSE,) 2 MG/1.5ML solution pen-injector, Inject 0.5 mg under the skin into the appropriate area as directed 1 (One) Time Per Week., Disp: 4.5 mL, Rfl: 3  •  tamsulosin (FLOMAX) 0.4 MG capsule 24 hr capsule, Take 1 capsule by mouth Daily., Disp: 30 capsule, Rfl: 1  •  Triamcinolone Acetonide (Nasacort Allergy 24HR) 55 MCG/ACT nasal inhaler, Instill 2 sprays into the nostril(s) as directed by provider Daily., Disp: 10.8 mL, Rfl: 11    Current Facility-Administered Medications:   •  triamcinolone acetonide (KENALOG-40) injection 40 mg, 40 mg, Intra-articular, Once, Ruchi Wilkins MD    Allergies:  No Known Allergies    Family Hx:  Family History   Problem Relation Age of Onset   • Osteoarthritis Mother    • Factor V Leiden deficiency Father    • Bipolar disorder Father    • Alcohol abuse Sister         Substance abuse   • No Known Problems Brother    • No Known Problems Daughter    • No Known Problems Son    • Alcohol abuse Paternal Aunt    • Bipolar disorder Paternal Aunt    • No Known Problems Maternal Grandmother    • No Known Problems Maternal Grandfather    • No Known Problems Paternal Grandmother    • Liver cancer Paternal Grandfather    • Bipolar disorder Paternal Aunt    • Alcohol abuse Paternal Aunt    • Hepatitis Paternal Aunt         Social History:  Social History     Socioeconomic History   • Marital status:    Tobacco Use   • Smoking status: Never Smoker   • Smokeless tobacco: Never Used   Substance and Sexual Activity   • Alcohol use: Yes   • Drug use: No   • Sexual activity: Yes     Partners: Female       Review of Systems  Review of Systems   Constitutional: Negative for chills and fever.   HENT: Negative for rhinorrhea and sore throat.    Eyes: Negative for photophobia and visual disturbance.   Respiratory: Negative for cough and shortness of breath.    Cardiovascular: Negative for chest pain and palpitations.   Gastrointestinal:  Negative for abdominal pain and nausea.   Genitourinary: Positive for difficulty urinating (  Nighttime only), frequency and urgency. Negative for dysuria.   Musculoskeletal: Negative for arthralgias and back pain.   Neurological: Negative for weakness, light-headedness and headaches.   Psychiatric/Behavioral: Negative for dysphoric mood and sleep disturbance.   All other systems reviewed and are negative.      Objective:     There were no vitals taken for this visit.  Physical Exam  Vitals reviewed.   Constitutional:       General: He is not in acute distress.     Appearance: Normal appearance. He is well-developed, well-groomed and overweight.      Interventions: Face mask in place.   HENT:      Head: Normocephalic.      Right Ear: Hearing and external ear normal.      Left Ear: Hearing and external ear normal.      Nose: Nose normal.      Mouth/Throat:      Lips: Pink.      Mouth: Mucous membranes are moist.   Eyes:      General: Lids are normal.      Conjunctiva/sclera: Conjunctivae normal.      Pupils: Pupils are equal, round, and reactive to light.   Cardiovascular:      Rate and Rhythm: Normal rate and regular rhythm.      Pulses: Normal pulses.      Heart sounds: Normal heart sounds. No murmur heard.  No friction rub. No gallop.    Pulmonary:      Effort: Pulmonary effort is normal.      Breath sounds: Normal breath sounds and air entry. No wheezing, rhonchi or rales.   Abdominal:      General: Bowel sounds are normal.      Palpations: Abdomen is soft.   Genitourinary:     Prostate: Normal. Not enlarged, not tender and no nodules present.      Rectum: Normal. No mass.   Musculoskeletal:      Cervical back: Neck supple.   Skin:     General: Skin is warm.   Neurological:      Mental Status: He is alert and oriented to person, place, and time.   Psychiatric:         Attention and Perception: Attention and perception normal.         Mood and Affect: Mood and affect normal.         Speech: Speech normal.          Behavior: Behavior normal. Behavior is cooperative.         Thought Content: Thought content normal.         Cognition and Memory: Cognition and memory normal.         Judgment: Judgment normal.          Assessment/Plan:     Diagnoses and all orders for this visit:    1. Urinary dysfunction (Primary)  -     Ambulatory Referral to Urology      1. Patient has abstained from masturbation since this started and symptoms persist. He has no trouble with ejaculation, and just has difficulty urinating at night time around 2000 every day. Referral to urology, patient may benefit from cystoscopy and further evaluation.     · Rx changes: As above  · Patient Education: As above  · Compliance at present is estimated to be excellent.   · Efforts to improve compliance (if necessary) will be directed at Continued physician-patient relationship.    Depression screening: Up to date; last screen 9/24/2021      Follow-up:     Return in about 3 months (around 5/10/2022) for Next scheduled follow up.    FOLLOW UP: (Check up)    Preventative:  Health Maintenance   Topic Date Due   • COVID-19 Vaccine (1) Never done   • TDAP/TD VACCINES (1 - Tdap) Never done   • INFLUENZA VACCINE  08/01/2021   • ANNUAL PHYSICAL  03/26/2022   • HEPATITIS C SCREENING  Completed   • Pneumococcal Vaccine 0-64  Aged Out     Male Preventative: Prostate cancer screening is up to date  Recommended: Td  Vaccine Counseling: Received in 2018  Interest in Covid Vaccine: The patient qualifies to receive the vaccine, but they have not yet received it.    Weight  -Class: Overweight: 25.0-29.9kg/m2   -Patient's There is no height or weight on file to calculate BMI. indicating that he is overweight (BMI 25-29.9). Patient's (Body mass index is 29.62 kg/m².) indicates that they are overweight with health conditions that include obstructive sleep apnea, hypertension, coronary heart disease, diabetes mellitus, dyslipidemias, GERD, peripheral vascular disease, idiopathic  intracranial hypertension, osteoarthritis, lower extremity venous stasis disease, urinary stress incontinence and peripheral vascular disease . Weight is unchanged. BMI is is above average and patient is aware.. We discussed portion control and increasing exercise. .   eat more fruits and vegetables, decrease soda or juice intake, increase water intake, increase physical activity, reduce portion size, cut out extra servings, reduce fast food intake, plan meals, and have 3 meals a day    Alcohol use:  reports current alcohol use.  Nicotine status  reports that he has never smoked. He has never used smokeless tobacco.    Goals    None         RISK SCORE: 2      This document has been electronically signed by Unique Cabrera MD on February 10, 2022 13:29 CST

## 2022-02-14 NOTE — PROGRESS NOTES
I have reviewed the notes, assessments, and/or procedures performed by Unique Cabrear MD, I concur with her/his documentation and assessment and plan for Wang Edwards.                This document has been electronically signed by Louie Hinkle MD on February 14, 2022 09:51 CST

## 2022-03-24 DIAGNOSIS — M79.675 PAIN OF TOE OF LEFT FOOT: Primary | ICD-10-CM

## 2022-03-30 ENCOUNTER — OFFICE VISIT (OUTPATIENT)
Dept: FAMILY MEDICINE CLINIC | Facility: CLINIC | Age: 37
End: 2022-03-30

## 2022-03-30 VITALS
BODY MASS INDEX: 29.76 KG/M2 | DIASTOLIC BLOOD PRESSURE: 88 MMHG | HEART RATE: 73 BPM | HEIGHT: 71 IN | WEIGHT: 212.6 LBS | OXYGEN SATURATION: 98 % | SYSTOLIC BLOOD PRESSURE: 136 MMHG

## 2022-03-30 DIAGNOSIS — M79.675 GREAT TOE PAIN, LEFT: Primary | ICD-10-CM

## 2022-03-30 DIAGNOSIS — R53.83 FATIGUE, UNSPECIFIED TYPE: ICD-10-CM

## 2022-03-30 PROCEDURE — 99213 OFFICE O/P EST LOW 20 MIN: CPT | Performed by: STUDENT IN AN ORGANIZED HEALTH CARE EDUCATION/TRAINING PROGRAM

## 2022-03-30 NOTE — PROGRESS NOTES
"  Family Medicine Residency  Hernando Escoto MD    Subjective:     Wang Edwards is a 36 y.o. male who presents for left toe pain.    Pain started 3 weeks ago.  No trauma.  Progressively worsening.  No changes in physical activity.  NSAIDS heat ice have not helped.  Patients toe is usually in a state of dorsiflexion and plantar flexion exacerbates the pain.  Pain is constant and \"hurts all the time\".  Xray previously done on a prior evaluation and it did not show any fractures and bony abnormalities.      Patient is also having issues with energy levels.  Does not know of any medical history that could cause this.    The following portions of the patient's history were reviewed and updated as appropriate: allergies, current medications, past family history, past medical history, past social history, past surgical history and problem list.    Past Medical Hx:  Past Medical History:   Diagnosis Date   • Anxiety    • Plantar fasciitis    • Verruca        Past Surgical Hx:  Past Surgical History:   Procedure Laterality Date   • SKIN BIOPSY  2021       Current Meds:    Current Outpatient Medications:   •  albuterol sulfate  (90 Base) MCG/ACT inhaler, Inhale 2 puffs Every 4 (Four) Hours As Needed for Wheezing., Disp: 18 g, Rfl: 11  •  cetirizine (zyrTEC) 10 MG tablet, Take 1 tablet by mouth Daily., Disp: 30 tablet, Rfl: 11  •  clobetasol (TEMOVATE) 0.05 % cream, Apply topically to the appropriate area as directed 2 (Two) Times a Day., Disp: 30 g, Rfl: 1  •  escitalopram (Lexapro) 10 MG tablet, Take 1 tablet by mouth Daily., Disp: 30 tablet, Rfl: 3  •  fluticasone (Flonase) 50 MCG/ACT nasal spray, 2 sprays into the nostril(s) as directed by provider Daily., Disp: 18.2 mL, Rfl: 11  •  ketoconazole (Nizoral) 2 % shampoo, Apply  topically to the appropriate area as directed 2 (Two) Times a Week., Disp: 120 mL, Rfl: 6  •  montelukast (Singulair) 10 MG tablet, Take 1 tablet by mouth Every Night., Disp: 30 tablet, Rfl: " 11  •  omeprazole (priLOSEC) 40 MG capsule, Take 1 capsule by mouth Daily., Disp: 30 capsule, Rfl: 11  •  rivaroxaban (Xarelto) 20 MG tablet, Take 1 tablet by mouth Daily for 180 days **Take with food**, Disp: 90 tablet, Rfl: 1  •  Semaglutide,0.25 or 0.5MG/DOS, (Ozempic, 0.25 or 0.5 MG/DOSE,) 2 MG/1.5ML solution pen-injector, Inject 0.5 mg under the skin into the appropriate area as directed 1 (One) Time Per Week., Disp: 4.5 mL, Rfl: 3  •  tamsulosin (FLOMAX) 0.4 MG capsule 24 hr capsule, Take 1 capsule by mouth Daily., Disp: 30 capsule, Rfl: 1  •  Triamcinolone Acetonide (Nasacort Allergy 24HR) 55 MCG/ACT nasal inhaler, Instill 2 sprays into the nostril(s) as directed by provider Daily., Disp: 10.8 mL, Rfl: 11    Current Facility-Administered Medications:   •  triamcinolone acetonide (KENALOG-40) injection 40 mg, 40 mg, Intra-articular, Once, Ruchi Wilkins MD    Allergies:  No Known Allergies    Family Hx:  Family History   Problem Relation Age of Onset   • Osteoarthritis Mother    • Factor V Leiden deficiency Father    • Bipolar disorder Father    • Alcohol abuse Sister         Substance abuse   • No Known Problems Brother    • No Known Problems Daughter    • No Known Problems Son    • Alcohol abuse Paternal Aunt    • Bipolar disorder Paternal Aunt    • No Known Problems Maternal Grandmother    • No Known Problems Maternal Grandfather    • No Known Problems Paternal Grandmother    • Liver cancer Paternal Grandfather    • Bipolar disorder Paternal Aunt    • Alcohol abuse Paternal Aunt    • Hepatitis Paternal Aunt         Social History:  Social History     Socioeconomic History   • Marital status:    Tobacco Use   • Smoking status: Never Smoker   • Smokeless tobacco: Never Used   Substance and Sexual Activity   • Alcohol use: Yes   • Drug use: No   • Sexual activity: Yes     Partners: Female       Review of Systems  Review of Systems   Constitutional: Positive for fatigue. Negative for chills and fever.  "  HENT: Negative for congestion, rhinorrhea and sore throat.    Eyes: Negative for visual disturbance.   Respiratory: Negative for chest tightness and shortness of breath.    Cardiovascular: Negative for chest pain and palpitations.   Gastrointestinal: Negative for abdominal pain, diarrhea, nausea and vomiting.   Endocrine: Negative for polyuria.   Genitourinary: Negative for difficulty urinating and dysuria.   Musculoskeletal: Positive for arthralgias. Negative for back pain and myalgias.   Skin: Negative for rash and wound.   Neurological: Negative for dizziness, weakness and numbness.   Hematological: Does not bruise/bleed easily.   Psychiatric/Behavioral: Negative for agitation, behavioral problems and confusion.       Objective:     /88   Pulse 73   Ht 180.3 cm (71\")   Wt 96.4 kg (212 lb 9.6 oz)   SpO2 98%   BMI 29.65 kg/m²   Physical Exam  Constitutional:       Appearance: Normal appearance.   HENT:      Head: Normocephalic and atraumatic.      Right Ear: External ear normal.      Left Ear: External ear normal.   Cardiovascular:      Rate and Rhythm: Normal rate and regular rhythm.      Heart sounds: Normal heart sounds.   Pulmonary:      Effort: Pulmonary effort is normal.      Breath sounds: Normal breath sounds.   Musculoskeletal:         General: Tenderness present. Normal range of motion.      Right lower leg: No edema.      Left lower leg: No edema.      Comments: Tenderness along second metatarsal on left foot first digit.  Pronounced with palpation along the bone and surrounding joints.  Tuning fork on bone exacerbates pain.   Skin:     General: Skin is warm and dry.   Neurological:      General: No focal deficit present.      Mental Status: He is alert.      Motor: No weakness.   Psychiatric:         Mood and Affect: Mood normal.         Behavior: Behavior normal.     XR foot 3+ vw left    Result Date: 3/29/2022  CONCLUSION: Normal left foot [id number] Electronically signed by:  Adrian Leyva MD  " 3/29/2022 8:50 PM CDT Workstation: [id number]       Assessment/Plan:     Diagnoses and all orders for this visit:    1. Great toe pain, left (Primary)  XR was read to be normal which only indicates that the problem is likely not from the gross bone pathology.  However, there are an extensive amount of connective tissues in the foot that may be the cause of the pain.  Also given location, could possibly be a stress fracture, which would also not show on XR.  At this time, given the patient's continued pain and severity, MRI would be the next indicated step in the patient's work up.  -     MRI Left Foot  -     Uric Acid; Future    2. Fatigue  Differential is broad, but likely due to extensive work hours and lack of sleep.  However, patient's testosterone levels have not been evaluated in the past.      -      Testosterone levels free and total.         Follow-up:     As needed.    Preventative:  Health Maintenance   Topic Date Due   • COVID-19 Vaccine (1) Never done   • INFLUENZA VACCINE  08/01/2021   • ANNUAL PHYSICAL  03/26/2022   • TDAP/TD VACCINES (2 - Td or Tdap) 03/01/2027   • HEPATITIS C SCREENING  Completed   • Pneumococcal Vaccine 0-64  Aged Out       Alcohol use:  reports current alcohol use.  Nicotine status  reports that he has never smoked. He has never used smokeless tobacco.    RISK SCORE: 1      This document has been electronically signed by Hernando Escoto MD on March 30, 2022 10:43 CDT

## 2022-04-01 ENCOUNTER — LAB (OUTPATIENT)
Dept: LAB | Facility: HOSPITAL | Age: 37
End: 2022-04-01

## 2022-04-01 DIAGNOSIS — M79.675 GREAT TOE PAIN, LEFT: ICD-10-CM

## 2022-04-01 DIAGNOSIS — R53.83 FATIGUE, UNSPECIFIED TYPE: ICD-10-CM

## 2022-04-01 PROCEDURE — 84402 ASSAY OF FREE TESTOSTERONE: CPT

## 2022-04-01 PROCEDURE — 84550 ASSAY OF BLOOD/URIC ACID: CPT

## 2022-04-01 PROCEDURE — 36415 COLL VENOUS BLD VENIPUNCTURE: CPT

## 2022-04-01 PROCEDURE — 84403 ASSAY OF TOTAL TESTOSTERONE: CPT

## 2022-04-02 LAB — URATE SERPL-MCNC: 4.5 MG/DL (ref 3.4–7)

## 2022-04-04 ENCOUNTER — APPOINTMENT (OUTPATIENT)
Dept: MRI IMAGING | Facility: HOSPITAL | Age: 37
End: 2022-04-04

## 2022-04-04 ENCOUNTER — TELEMEDICINE (OUTPATIENT)
Dept: FAMILY MEDICINE CLINIC | Facility: TELEHEALTH | Age: 37
End: 2022-04-04

## 2022-04-04 DIAGNOSIS — R05.9 COUGH: ICD-10-CM

## 2022-04-04 DIAGNOSIS — R50.9 FEVER, UNSPECIFIED FEVER CAUSE: ICD-10-CM

## 2022-04-04 DIAGNOSIS — R11.2 NAUSEA AND VOMITING, UNSPECIFIED VOMITING TYPE: Primary | ICD-10-CM

## 2022-04-04 PROCEDURE — BHEMPVIDEOVISIT: Performed by: NURSE PRACTITIONER

## 2022-04-04 NOTE — PROGRESS NOTES
You have chosen to receive care through a telehealth visit.  Do you consent to use a video/audio connection for your medical care today? Yes     CHIEF COMPLAINT  No chief complaint on file.        HPI  Wang Edwards is a 36 y.o. male  presents with complaint of woke nausea, vomiting, fever, myaglia, dry cough.  Has zofran at home which he took this morning and it has helped with the nausea and vomiting.   Needs PCR for RTW     employee    Review of Systems   Constitutional: Positive for activity change and fever. Negative for chills.   Respiratory: Positive for cough.    Gastrointestinal: Positive for nausea and vomiting.   Musculoskeletal: Positive for arthralgias and myalgias.   All other systems reviewed and are negative.      Past Medical History:   Diagnosis Date   • Anxiety    • Plantar fasciitis    • Verruca        Family History   Problem Relation Age of Onset   • Osteoarthritis Mother    • Factor V Leiden deficiency Father    • Bipolar disorder Father    • Alcohol abuse Sister         Substance abuse   • No Known Problems Brother    • No Known Problems Daughter    • No Known Problems Son    • Alcohol abuse Paternal Aunt    • Bipolar disorder Paternal Aunt    • No Known Problems Maternal Grandmother    • No Known Problems Maternal Grandfather    • No Known Problems Paternal Grandmother    • Liver cancer Paternal Grandfather    • Bipolar disorder Paternal Aunt    • Alcohol abuse Paternal Aunt    • Hepatitis Paternal Aunt        Social History     Socioeconomic History   • Marital status:    Tobacco Use   • Smoking status: Never Smoker   • Smokeless tobacco: Never Used   Substance and Sexual Activity   • Alcohol use: Yes   • Drug use: No   • Sexual activity: Yes     Partners: Female       Wang Edwards  reports that he has never smoked. He has never used smokeless tobacco..               There were no vitals taken for this visit.    PHYSICAL EXAM  Physical Exam   Constitutional: He is  oriented to person, place, and time. He appears well-developed and well-nourished. He does not have a sickly appearance. He does not appear ill.   HENT:   Head: Normocephalic and atraumatic.   Pulmonary/Chest: Effort normal.  No respiratory distress.  Neurological: He is alert and oriented to person, place, and time.       Results for orders placed or performed in visit on 04/01/22   Uric Acid    Specimen: Blood   Result Value Ref Range    Uric Acid 4.5 3.4 - 7.0 mg/dL       Diagnoses and all orders for this visit:    1. Nausea and vomiting, unspecified vomiting type (Primary)  -     COVID-19,[facility] ROUTINE, NP/OP SWAB IN TRANSPORT MEDIA OR ESWAB 72 HR TAT - Swab, Nasopharynx; Future    2. Cough  -     COVID-19,[facility] ROUTINE, NP/OP SWAB IN TRANSPORT MEDIA OR ESWAB 72 HR TAT - Swab, Nasopharynx; Future    3. Fever, unspecified fever cause  -     COVID-19,[facility] ROUTINE, NP/OP SWAB IN TRANSPORT MEDIA OR ESWAB 72 HR TAT - Swab, Nasopharynx; Future    --COVID-19 test to rule out infection  --quarantine and symptomatic treatment advised      FOLLOW-UP  As discussed during visit with PCP/[facility] if no improvement or Urgent Care/Emergency Department if worsening of symptoms    Patient verbalizes understanding of medication dosage, comfort measures, instructions for treatment and follow-up.    Zulma Bosch, JJ  04/04/2022  10:06 EDT    This visit was performed via Telehealth.  This patient has been instructed to follow-up with their primary care provider if their symptoms worsen or the treatment provided does not resolve their illness.

## 2022-04-04 NOTE — PATIENT INSTRUCTIONS
Nausea and Vomiting, Adult  Nausea is the feeling that you have an upset stomach or that you are about to vomit. Vomiting is when stomach contents are thrown up and out of the mouth as a result of nausea. Vomiting can make you feel weak and cause you to become dehydrated.  Dehydration can make you feel tired and thirsty, cause you to have a dry mouth, and decrease how often you urinate. Older adults and people with other diseases or a weak disease-fighting system (immune system) are at higher risk for dehydration. It is important to treat your nausea and vomiting as told by your health care provider.  Follow these instructions at home:  Watch your symptoms for any changes. Tell your health care provider about them. Follow these instructions to care for yourself at home.  Eating and drinking         Take an oral rehydration solution (ORS). This is a drink that is sold at pharmacies and retail stores.  Drink clear fluids slowly and in small amounts as you are able. Clear fluids include water, ice chips, low-calorie sports drinks, and fruit juice that has water added (diluted fruit juice).  Eat bland, easy-to-digest foods in small amounts as you are able. These foods include bananas, applesauce, rice, lean meats, toast, and crackers.  Avoid fluids that contain a lot of sugar or caffeine, such as energy drinks, sports drinks, and soda.  Avoid alcohol.  Avoid spicy or fatty foods.  General instructions  Take over-the-counter and prescription medicines only as told by your health care provider.  Drink enough fluid to keep your urine pale yellow.  Wash your hands often using soap and water. If soap and water are not available, use hand .  Make sure that all people in your household wash their hands well and often.  Rest at home while you recover.  Watch your condition for any changes.  Breathe slowly and deeply when you feel nauseated.  Keep all follow-up visits as told by your health care provider. This is  important.  Contact a health care provider if:  Your symptoms get worse.  You have new symptoms.  You have a fever.  You cannot drink fluids without vomiting.  Your nausea does not go away after 2 days.  You feel light-headed or dizzy.  You have a headache.  You have muscle cramps.  You have a rash.  You have pain while urinating.  Get help right away if:  You have pain in your chest, neck, arm, or jaw.  You feel extremely weak or you faint.  You have persistent vomiting.  You have vomit that is bright red or looks like black coffee grounds.  You have bloody or black stools or stools that look like tar.  You have a severe headache, a stiff neck, or both.  You have severe pain, cramping, or bloating in your abdomen.  You have difficulty breathing, or you are breathing very quickly.  Your heart is beating very quickly.  Your skin feels cold and clammy.  You feel confused.  You have signs of dehydration, such as:  Dark urine, very little urine, or no urine.  Cracked lips.  Dry mouth.  Sunken eyes.  Sleepiness.  Weakness.  These symptoms may represent a serious problem that is an emergency. Do not wait to see if the symptoms will go away. Get medical help right away. Call your local emergency services (911 in the [place] Do not drive yourself to the hospital.  Summary  Nausea is the feeling that you have an upset stomach or that you are about to vomit. As nausea gets worse, it can lead to vomiting. Vomiting can make you feel weak and cause you to become dehydrated.  Follow instructions from your health care provider about eating and drinking to prevent dehydration.  Take over-the-counter and prescription medicines only as told by your health care provider.  Contact your health care provider if your symptoms get worse, or you have new symptoms.  Keep all follow-up visits as told by your health care provider. This is important.  This information is not intended to replace advice given to you by your health care provider.  Make sure you discuss any questions you have with your health care provider.  Document Revised: 04/10/2020 Document Reviewed: 05/28/2019  [employer] Patient Education © 2021 [email or web address]  Cough, Adult  Coughing is a reflex that clears your throat and your airways (respiratory system). Coughing helps to heal and protect your lungs. It is normal to cough occasionally, but a cough that happens with other symptoms or lasts a long time may be a sign of a condition that needs treatment. An acute cough may only last 2-3 weeks, while a chronic cough may last 8 or more weeks.  Coughing is commonly caused by:  Infection of the respiratory systemby viruses or bacteria.  Breathing in substances that irritate your lungs.  Allergies.  Asthma.  Mucus that runs down the back of your throat (postnasal drip).  Smoking.  Acid backing up from the stomach into the esophagus (gastroesophageal reflux).  Certain medicines.  Chronic lung problems.  Other medical conditions such as heart failure or a blood clot in the lung (pulmonary embolism).  Follow these instructions at home:  Medicines  Take over-the-counter and prescription medicines only as told by your health care provider.  Talk with your health care provider before you take a cough suppressant medicine.  Lifestyle    Avoid cigarette smoke. Do not use any products that contain nicotine or tobacco, such as cigarettes, e-cigarettes, and chewing tobacco. If you need help quitting, ask your health care provider.  Drink enough fluid to keep your urine pale yellow.  Avoid caffeine.  Do not drink alcohol if your health care provider tells you not to drink.    General instructions    Pay close attention to changes in your cough. Tell your health care provider about them.  Always cover your mouth when you cough.  Avoid things that make you cough, such as perfume, candles, cleaning products, or campfire or tobacco smoke.  If the air is dry, use a cool mist vaporizer or humidifier in your  bedroom or your home to help loosen secretions.  If your cough is worse at night, try to sleep in a semi-upright position.  Rest as needed.  Keep all follow-up visits as told by your health care provider. This is important.    Contact a health care provider if you:  Have new symptoms.  Cough up pus.  Have a cough that does not get better after 2-3 weeks or gets worse.  Cannot control your cough with cough suppressant medicines and you are losing sleep.  Have pain that gets worse or pain that is not helped with medicine.  Have a fever.  Have unexplained weight loss.  Have night sweats.  Get help right away if:  You cough up blood.  You have difficulty breathing.  Your heartbeat is very fast.  These symptoms may represent a serious problem that is an emergency. Do not wait to see if the symptoms will go away. Get medical help right away. Call your local emergency services (911 in the [place] Do not drive yourself to the hospital.  Summary  Coughing is a reflex that clears your throat and your airways. It is normal to cough occasionally, but a cough that happens with other symptoms or lasts a long time may be a sign of a condition that needs treatment.  Take over-the-counter and prescription medicines only as told by your health care provider.  Always cover your mouth when you cough.  Contact a health care provider if you have new symptoms or a cough that does not get better after 2-3 weeks or gets worse.  This information is not intended to replace advice given to you by your health care provider. Make sure you discuss any questions you have with your health care provider.  Document Revised: 01/06/2020 Document Reviewed: 01/06/2020  [employer] Patient Education © 2021 [employer]

## 2022-04-14 LAB
TESTOST FREE SERPL-MCNC: 11.1 PG/ML (ref 8.7–25.1)
TESTOST SERPL-MCNC: 239.7 NG/DL (ref 264–916)

## 2022-04-15 DIAGNOSIS — D68.51 HETEROZYGOUS FACTOR V LEIDEN MUTATION: ICD-10-CM

## 2022-04-25 ENCOUNTER — HOSPITAL ENCOUNTER (OUTPATIENT)
Dept: MRI IMAGING | Facility: HOSPITAL | Age: 37
Discharge: HOME | End: 2022-04-25
Admitting: FAMILY MEDICINE

## 2022-04-25 DIAGNOSIS — M79.675 GREAT TOE PAIN, LEFT: ICD-10-CM

## 2022-04-25 PROCEDURE — 73718 MRI LOWER EXTREMITY W/O DYE: CPT

## 2022-04-26 DIAGNOSIS — F32.A ANXIETY AND DEPRESSION: ICD-10-CM

## 2022-04-26 DIAGNOSIS — F41.9 ANXIETY AND DEPRESSION: ICD-10-CM

## 2022-04-26 RX ORDER — ESCITALOPRAM OXALATE 10 MG/1
10 TABLET ORAL DAILY
Qty: 30 TABLET | Refills: 3 | Status: SHIPPED | OUTPATIENT
Start: 2022-04-26 | End: 2022-05-26 | Stop reason: ALTCHOICE

## 2022-05-10 DIAGNOSIS — R11.0 NAUSEA: Primary | ICD-10-CM

## 2022-05-10 DIAGNOSIS — F32.A ANXIETY AND DEPRESSION: ICD-10-CM

## 2022-05-10 DIAGNOSIS — F41.9 ANXIETY AND DEPRESSION: ICD-10-CM

## 2022-05-10 RX ORDER — ONDANSETRON HYDROCHLORIDE 8 MG/1
8 TABLET, FILM COATED ORAL EVERY 8 HOURS PRN
Qty: 90 TABLET | Refills: 0 | Status: SHIPPED | OUTPATIENT
Start: 2022-05-10

## 2022-05-10 RX ORDER — OMEPRAZOLE 40 MG/1
40 CAPSULE, DELAYED RELEASE ORAL DAILY
Qty: 30 CAPSULE | Refills: 11 | Status: SHIPPED | OUTPATIENT
Start: 2022-05-10 | End: 2022-08-17 | Stop reason: SDUPTHER

## 2022-05-26 ENCOUNTER — OFFICE VISIT (OUTPATIENT)
Dept: FAMILY MEDICINE CLINIC | Facility: CLINIC | Age: 37
End: 2022-05-26

## 2022-05-26 VITALS
WEIGHT: 192 LBS | OXYGEN SATURATION: 97 % | SYSTOLIC BLOOD PRESSURE: 108 MMHG | BODY MASS INDEX: 26.88 KG/M2 | DIASTOLIC BLOOD PRESSURE: 80 MMHG | TEMPERATURE: 96.9 F | HEART RATE: 84 BPM | HEIGHT: 71 IN

## 2022-05-26 DIAGNOSIS — F52.32 DELAYED EJACULATION: ICD-10-CM

## 2022-05-26 DIAGNOSIS — F32.A ANXIETY AND DEPRESSION: Primary | ICD-10-CM

## 2022-05-26 DIAGNOSIS — F41.9 ANXIETY AND DEPRESSION: Primary | ICD-10-CM

## 2022-05-26 PROCEDURE — 99213 OFFICE O/P EST LOW 20 MIN: CPT | Performed by: STUDENT IN AN ORGANIZED HEALTH CARE EDUCATION/TRAINING PROGRAM

## 2022-05-26 RX ORDER — VORTIOXETINE 5 MG/1
5 TABLET, FILM COATED ORAL
Qty: 30 TABLET | Refills: 0 | Status: SHIPPED | OUTPATIENT
Start: 2022-05-26 | End: 2022-07-06 | Stop reason: DRUGHIGH

## 2022-05-26 NOTE — PROGRESS NOTES
Family Medicine Residency  Hiro Carr MD    Subjective:     Wang Edwards is a 36 y.o. male who presents for anxiety/depression follow up. Symptoms well controlled on Lexapro 10 mg but unfortunately caused delayed ejaculation. Patient would like to switch to a different agent with less sexual side effect.     The following portions of the patient's history were reviewed and updated as appropriate: allergies, current medications, past family history, past medical history, past social history, past surgical history and problem list.    Past Medical Hx:  Past Medical History:   Diagnosis Date   • Anxiety    • Plantar fasciitis    • Verruca        Past Surgical Hx:  Past Surgical History:   Procedure Laterality Date   • SKIN BIOPSY  2021       Current Meds:    Current Outpatient Medications:   •  albuterol sulfate  (90 Base) MCG/ACT inhaler, Inhale 2 puffs Every 4 (Four) Hours As Needed for Wheezing., Disp: 18 g, Rfl: 11  •  cetirizine (zyrTEC) 10 MG tablet, Take 1 tablet by mouth Daily., Disp: 30 tablet, Rfl: 11  •  clobetasol (TEMOVATE) 0.05 % cream, Apply topically to the appropriate area as directed 2 (Two) Times a Day., Disp: 30 g, Rfl: 1  •  fluticasone (Flonase) 50 MCG/ACT nasal spray, 2 sprays into the nostril(s) as directed by provider Daily., Disp: 18.2 mL, Rfl: 11  •  ketoconazole (Nizoral) 2 % shampoo, Apply  topically to the appropriate area as directed 2 (Two) Times a Week., Disp: 120 mL, Rfl: 6  •  montelukast (Singulair) 10 MG tablet, Take 1 tablet by mouth Every Night., Disp: 30 tablet, Rfl: 11  •  omeprazole (priLOSEC) 40 MG capsule, Take 1 capsule by mouth Daily., Disp: 30 capsule, Rfl: 11  •  ondansetron (Zofran) 8 MG tablet, Take 1 tablet by mouth Every 8 (Eight) Hours As Needed for Nausea or Vomiting., Disp: 90 tablet, Rfl: 0  •  rivaroxaban (Xarelto) 20 MG tablet, Take 1 tablet by mouth Daily **Take with food**, Disp: 90 tablet, Rfl: 3  •  Semaglutide, 1 MG/DOSE, 4 MG/3ML  solution pen-injector, Inject 1 mg under the skin into the appropriate area as directed 1 (One) Time Per Week., Disp: 9 mL, Rfl: 3  •  tamsulosin (FLOMAX) 0.4 MG capsule 24 hr capsule, Take 1 capsule by mouth Daily., Disp: 30 capsule, Rfl: 1  •  Triamcinolone Acetonide (Nasacort Allergy 24HR) 55 MCG/ACT nasal inhaler, Instill 2 sprays into the nostril(s) as directed by provider Daily., Disp: 10.8 mL, Rfl: 11    Current Facility-Administered Medications:   •  triamcinolone acetonide (KENALOG-40) injection 40 mg, 40 mg, Intra-articular, Once, Ruchi Wilkins MD    Allergies:  No Known Allergies    Family Hx:  Family History   Problem Relation Age of Onset   • Osteoarthritis Mother    • Factor V Leiden deficiency Father    • Bipolar disorder Father    • Alcohol abuse Sister         Substance abuse   • No Known Problems Brother    • No Known Problems Daughter    • No Known Problems Son    • Alcohol abuse Paternal Aunt    • Bipolar disorder Paternal Aunt    • No Known Problems Maternal Grandmother    • No Known Problems Maternal Grandfather    • No Known Problems Paternal Grandmother    • Liver cancer Paternal Grandfather    • Bipolar disorder Paternal Aunt    • Alcohol abuse Paternal Aunt    • Hepatitis Paternal Aunt         Social History:  Social History     Socioeconomic History   • Marital status:    Tobacco Use   • Smoking status: Never Smoker   • Smokeless tobacco: Never Used   Substance and Sexual Activity   • Alcohol use: Yes   • Drug use: No   • Sexual activity: Yes     Partners: Female       Review of Systems  Review of Systems   Constitutional: Negative for chills and fever.   HENT: Negative for facial swelling, nosebleeds and trouble swallowing.    Eyes: Negative for photophobia and visual disturbance.   Respiratory: Negative for choking and stridor.    Gastrointestinal: Negative for abdominal distention, diarrhea, nausea and vomiting.   Genitourinary: Negative for difficulty urinating and dysuria.  "       Delayed ejaculation   Musculoskeletal: Negative for arthralgias, gait problem and myalgias.   Skin: Negative for pallor and rash.   Neurological: Negative for seizures and syncope.   Psychiatric/Behavioral: Negative for dysphoric mood and hallucinations.       Objective:     /80   Pulse 84   Temp 96.9 °F (36.1 °C)   Ht 180.3 cm (71\")   Wt 87.1 kg (192 lb)   SpO2 97%   BMI 26.78 kg/m²   Physical Exam  Constitutional:       General: He is not in acute distress.     Appearance: He is well-developed.   HENT:      Head: Normocephalic and atraumatic.      Nose: Nose normal.   Eyes:      Conjunctiva/sclera: Conjunctivae normal.      Pupils: Pupils are equal, round, and reactive to light.   Cardiovascular:      Rate and Rhythm: Normal rate and regular rhythm.      Pulses: Normal pulses.      Heart sounds: Normal heart sounds.   Pulmonary:      Effort: Pulmonary effort is normal.   Abdominal:      General: Bowel sounds are normal.      Palpations: Abdomen is soft.   Musculoskeletal:         General: Normal range of motion.      Cervical back: Normal range of motion and neck supple.   Skin:     General: Skin is warm and dry.   Neurological:      Mental Status: He is alert. Mental status is at baseline.      Cranial Nerves: No cranial nerve deficit.      Coordination: Coordination normal.   Psychiatric:         Mood and Affect: Mood normal.         Behavior: Behavior normal.          Assessment/Plan:     Diagnoses and all orders for this visit:    1. Anxiety and depression (Primary)    2. Delayed ejaculation      -We will discontinue lexapro  -Will start low dose trintellex. F/u in 2 weeks for revaluation. Recommend ongoing phq2 and torrey 7 tests      Follow-up:     Return in about 2 weeks (around 6/9/2022) for Next scheduled follow up.    Preventative:  Health Maintenance   Topic Date Due   • COVID-19 Vaccine (1) Never done   • ANNUAL PHYSICAL  03/26/2022   • INFLUENZA VACCINE  08/01/2022   • TDAP/TD VACCINES " (2 - Td or Tdap) 03/01/2027   • HEPATITIS C SCREENING  Completed   • Pneumococcal Vaccine 0-64  Aged Out       Alcohol use:  reports current alcohol use.  Nicotine status  reports that he has never smoked. He has never used smokeless tobacco.     Goals    None         RISK SCORE: 3          PGY-3  [facility] Family Medicine Residency  This document has been electronically signed by Hiro Carr MD on May 26, 2022 15:40 CDT

## 2022-06-08 ENCOUNTER — OFFICE VISIT (OUTPATIENT)
Dept: FAMILY MEDICINE CLINIC | Facility: CLINIC | Age: 37
End: 2022-06-08

## 2022-06-08 VITALS
WEIGHT: 192 LBS | SYSTOLIC BLOOD PRESSURE: 132 MMHG | OXYGEN SATURATION: 98 % | HEIGHT: 71 IN | BODY MASS INDEX: 26.88 KG/M2 | HEART RATE: 84 BPM | DIASTOLIC BLOOD PRESSURE: 82 MMHG

## 2022-06-08 DIAGNOSIS — F32.A ANXIETY AND DEPRESSION: Primary | ICD-10-CM

## 2022-06-08 DIAGNOSIS — F41.9 ANXIETY AND DEPRESSION: Primary | ICD-10-CM

## 2022-06-08 PROCEDURE — 99213 OFFICE O/P EST LOW 20 MIN: CPT | Performed by: STUDENT IN AN ORGANIZED HEALTH CARE EDUCATION/TRAINING PROGRAM

## 2022-06-08 NOTE — PROGRESS NOTES
Family Medicine Residency  Hiro Carr MD    Subjective:     Wang Edwards is a 36 y.o. male who presents for: Anxiety and depression. Patient says that the Trintellix has been working well for him. Vitals stable in clinic. Has noted an improvement in previous sexual side effects.     The following portions of the patient's history were reviewed and updated as appropriate: allergies, current medications, past family history, past medical history, past social history, past surgical history and problem list.    Past Medical Hx:  Past Medical History:   Diagnosis Date   • Anxiety    • Plantar fasciitis    • Verruca        Past Surgical Hx:  Past Surgical History:   Procedure Laterality Date   • SKIN BIOPSY  2021       Current Meds:    Current Outpatient Medications:   •  albuterol sulfate  (90 Base) MCG/ACT inhaler, Inhale 2 puffs Every 4 (Four) Hours As Needed for Wheezing., Disp: 18 g, Rfl: 11  •  cetirizine (zyrTEC) 10 MG tablet, Take 1 tablet by mouth Daily., Disp: 30 tablet, Rfl: 11  •  clobetasol (TEMOVATE) 0.05 % cream, Apply topically to the appropriate area as directed 2 (Two) Times a Day., Disp: 30 g, Rfl: 1  •  fluticasone (Flonase) 50 MCG/ACT nasal spray, 2 sprays into the nostril(s) as directed by provider Daily., Disp: 18.2 mL, Rfl: 11  •  ketoconazole (Nizoral) 2 % shampoo, Apply  topically to the appropriate area as directed 2 (Two) Times a Week., Disp: 120 mL, Rfl: 6  •  montelukast (Singulair) 10 MG tablet, Take 1 tablet by mouth Every Night., Disp: 30 tablet, Rfl: 11  •  omeprazole (priLOSEC) 40 MG capsule, Take 1 capsule by mouth Daily., Disp: 30 capsule, Rfl: 11  •  ondansetron (Zofran) 8 MG tablet, Take 1 tablet by mouth Every 8 (Eight) Hours As Needed for Nausea or Vomiting., Disp: 90 tablet, Rfl: 0  •  rivaroxaban (Xarelto) 20 MG tablet, Take 1 tablet by mouth Daily **Take with food**, Disp: 90 tablet, Rfl: 3  •  Semaglutide, 1 MG/DOSE, 4 MG/3ML solution pen-injector, Inject  1 mg under the skin into the appropriate area as directed 1 (One) Time Per Week., Disp: 9 mL, Rfl: 3  •  tamsulosin (FLOMAX) 0.4 MG capsule 24 hr capsule, Take 1 capsule by mouth Daily., Disp: 30 capsule, Rfl: 1  •  Triamcinolone Acetonide (Nasacort Allergy 24HR) 55 MCG/ACT nasal inhaler, Instill 2 sprays into the nostril(s) as directed by provider Daily., Disp: 10.8 mL, Rfl: 11  •  Vortioxetine HBr (Trintellix) 5 MG tablet, Take 1 tablet by mouth Daily With Breakfast., Disp: 30 tablet, Rfl: 0    Current Facility-Administered Medications:   •  triamcinolone acetonide (KENALOG-40) injection 40 mg, 40 mg, Intra-articular, Once, Ruchi Wilkins MD    Allergies:  No Known Allergies    Family Hx:  Family History   Problem Relation Age of Onset   • Osteoarthritis Mother    • Factor V Leiden deficiency Father    • Bipolar disorder Father    • Alcohol abuse Sister         Substance abuse   • No Known Problems Brother    • No Known Problems Daughter    • No Known Problems Son    • Alcohol abuse Paternal Aunt    • Bipolar disorder Paternal Aunt    • No Known Problems Maternal Grandmother    • No Known Problems Maternal Grandfather    • No Known Problems Paternal Grandmother    • Liver cancer Paternal Grandfather    • Bipolar disorder Paternal Aunt    • Alcohol abuse Paternal Aunt    • Hepatitis Paternal Aunt         Social History:  Social History     Socioeconomic History   • Marital status:    Tobacco Use   • Smoking status: Never Smoker   • Smokeless tobacco: Never Used   Substance and Sexual Activity   • Alcohol use: Yes   • Drug use: No   • Sexual activity: Yes     Partners: Female       Review of Systems  Review of Systems   Constitutional: Negative for chills and fever.   HENT: Negative for facial swelling, nosebleeds and trouble swallowing.    Eyes: Negative for photophobia and visual disturbance.   Respiratory: Negative for choking and stridor.    Gastrointestinal: Negative for abdominal distention, diarrhea,  "nausea and vomiting.   Genitourinary: Negative for difficulty urinating and dysuria.   Musculoskeletal: Negative for arthralgias, gait problem and myalgias.   Skin: Negative for pallor and rash.   Neurological: Negative for seizures and syncope.   Psychiatric/Behavioral: Negative for dysphoric mood and hallucinations.       Objective:     /82   Pulse 84   Ht 180.3 cm (71\")   Wt 87.1 kg (192 lb)   SpO2 98%   BMI 26.78 kg/m²   Physical Exam  Constitutional:       General: He is not in acute distress.     Appearance: He is well-developed.   HENT:      Head: Normocephalic and atraumatic.      Nose: Nose normal.   Eyes:      Conjunctiva/sclera: Conjunctivae normal.      Pupils: Pupils are equal, round, and reactive to light.   Cardiovascular:      Rate and Rhythm: Normal rate and regular rhythm.      Pulses: Normal pulses.      Heart sounds: Normal heart sounds.   Pulmonary:      Effort: Pulmonary effort is normal.   Abdominal:      General: Bowel sounds are normal.      Palpations: Abdomen is soft.   Musculoskeletal:         General: Normal range of motion.      Cervical back: Normal range of motion and neck supple.   Skin:     General: Skin is warm and dry.   Neurological:      Mental Status: He is alert. Mental status is at baseline.      Cranial Nerves: No cranial nerve deficit.      Coordination: Coordination normal.   Psychiatric:         Mood and Affect: Mood normal.         Behavior: Behavior normal.          Assessment/Plan:     Diagnoses and all orders for this visit:    1. Anxiety and depression (Primary)      -Tolerating Trintellix well. Continue current regimen.     Follow-up:     Return in about 4 weeks (around 7/6/2022) for Next scheduled follow up.    Preventative:  Health Maintenance   Topic Date Due   • COVID-19 Vaccine (1) Never done   • ANNUAL PHYSICAL  03/26/2022   • INFLUENZA VACCINE  08/01/2022   • TDAP/TD VACCINES (2 - Td or Tdap) 03/01/2027   • HEPATITIS C SCREENING  Completed   • " Pneumococcal Vaccine 0-64  Aged Out         Alcohol use:  reports current alcohol use.  Nicotine status  reports that he has never smoked. He has never used smokeless tobacco.     Goals    None         RISK SCORE: 3          PGY-3  [facility] Family Medicine Residency  This document has been electronically signed by Hiro Carr MD on June 8, 2022 08:57 CDT

## 2022-06-08 NOTE — PROGRESS NOTES
I have reviewed the patient.  I have reviewed the notes, assessments, and/or procedures performed by Dr Hiro Carr, I concur with his  documentation and assessment and plan for Wang Edwards.          This document has been electronically signed by Murali Heredia MD on June 8, 2022 13:11 CDT

## 2022-06-23 ENCOUNTER — OFFICE VISIT (OUTPATIENT)
Dept: FAMILY MEDICINE CLINIC | Facility: CLINIC | Age: 37
End: 2022-06-23

## 2022-06-23 DIAGNOSIS — Z51.81 MEDICATION MONITORING ENCOUNTER: ICD-10-CM

## 2022-06-23 DIAGNOSIS — E34.9 HYPOTESTOSTERONEMIA: Primary | ICD-10-CM

## 2022-06-23 PROCEDURE — 99213 OFFICE O/P EST LOW 20 MIN: CPT | Performed by: STUDENT IN AN ORGANIZED HEALTH CARE EDUCATION/TRAINING PROGRAM

## 2022-06-23 RX ORDER — TESTOSTERONE GEL, 1% 10 MG/G
100 GEL TRANSDERMAL DAILY
Qty: 300 G | Refills: 3 | Status: SHIPPED | OUTPATIENT
Start: 2022-06-23 | End: 2023-01-30

## 2022-06-23 NOTE — PROGRESS NOTES
Family Medicine Residency  Isabelle Martinez MD    Subjective:     Wang Edwards is a 37 y.o. male who presents for follow up from low testosterone lab results.    Patient was tested for testosterone and free testosterone based on low libido, fatigue, and not being aggressive.  Results show patient has hypotestosteronism.    The following portions of the patient's history were reviewed and updated as appropriate: allergies, current medications, past family history, past medical history, past social history, past surgical history and problem list.    Past Medical Hx:  Past Medical History:   Diagnosis Date   • Anxiety    • Plantar fasciitis    • Verruca        Past Surgical Hx:  Past Surgical History:   Procedure Laterality Date   • SKIN BIOPSY  2021       Current Meds:    Current Outpatient Medications:   •  albuterol sulfate  (90 Base) MCG/ACT inhaler, Inhale 2 puffs Every 4 (Four) Hours As Needed for Wheezing., Disp: 18 g, Rfl: 11  •  cetirizine (zyrTEC) 10 MG tablet, Take 1 tablet by mouth Daily., Disp: 30 tablet, Rfl: 11  •  clobetasol (TEMOVATE) 0.05 % cream, Apply topically to the appropriate area as directed 2 (Two) Times a Day., Disp: 30 g, Rfl: 1  •  fluticasone (Flonase) 50 MCG/ACT nasal spray, 2 sprays into the nostril(s) as directed by provider Daily., Disp: 18.2 mL, Rfl: 11  •  ketoconazole (Nizoral) 2 % shampoo, Apply  topically to the appropriate area as directed 2 (Two) Times a Week., Disp: 120 mL, Rfl: 6  •  montelukast (Singulair) 10 MG tablet, Take 1 tablet by mouth Every Night., Disp: 30 tablet, Rfl: 11  •  omeprazole (priLOSEC) 40 MG capsule, Take 1 capsule by mouth Daily., Disp: 30 capsule, Rfl: 11  •  ondansetron (Zofran) 8 MG tablet, Take 1 tablet by mouth Every 8 (Eight) Hours As Needed for Nausea or Vomiting., Disp: 90 tablet, Rfl: 0  •  rivaroxaban (Xarelto) 20 MG tablet, Take 1 tablet by mouth Daily **Take with food**, Disp: 90 tablet, Rfl: 3  •  Semaglutide, 1 MG/DOSE, 4  MG/3ML solution pen-injector, Inject 1 mg under the skin into the appropriate area as directed 1 (One) Time Per Week., Disp: 9 mL, Rfl: 3  •  tamsulosin (FLOMAX) 0.4 MG capsule 24 hr capsule, Take 1 capsule by mouth Daily., Disp: 30 capsule, Rfl: 1  •  testosterone (ANDROGEL) 50 MG/5GM (1%) gel gel, Apply 100 mg (2 tubes) on the skin as directed by provider Daily., Disp: 300 g, Rfl: 3  •  Triamcinolone Acetonide (Nasacort Allergy 24HR) 55 MCG/ACT nasal inhaler, Instill 2 sprays into the nostril(s) as directed by provider Daily., Disp: 10.8 mL, Rfl: 11  •  Vortioxetine HBr (Trintellix) 5 MG tablet, Take 1 tablet by mouth Daily With Breakfast., Disp: 30 tablet, Rfl: 0    Current Facility-Administered Medications:   •  triamcinolone acetonide (KENALOG-40) injection 40 mg, 40 mg, Intra-articular, Once, Ruchi Wilkins MD    Allergies:  No Known Allergies    Family Hx:  Family History   Problem Relation Age of Onset   • Osteoarthritis Mother    • Factor V Leiden deficiency Father    • Bipolar disorder Father    • Alcohol abuse Sister         Substance abuse   • No Known Problems Brother    • No Known Problems Daughter    • No Known Problems Son    • Alcohol abuse Paternal Aunt    • Bipolar disorder Paternal Aunt    • No Known Problems Maternal Grandmother    • No Known Problems Maternal Grandfather    • No Known Problems Paternal Grandmother    • Liver cancer Paternal Grandfather    • Bipolar disorder Paternal Aunt    • Alcohol abuse Paternal Aunt    • Hepatitis Paternal Aunt         Social History:  Social History     Socioeconomic History   • Marital status:    Tobacco Use   • Smoking status: Never Smoker   • Smokeless tobacco: Never Used   Substance and Sexual Activity   • Alcohol use: Yes   • Drug use: No   • Sexual activity: Yes     Partners: Female       Review of Systems  Review of Systems   Constitutional: Positive for fatigue. Negative for activity change and appetite change.   HENT: Negative for  congestion and ear pain.    Eyes: Negative for pain and discharge.   Respiratory: Negative for chest tightness and shortness of breath.    Cardiovascular: Negative for chest pain and palpitations.   Gastrointestinal: Negative for abdominal distention and abdominal pain.   Endocrine: Negative for cold intolerance and heat intolerance.   Genitourinary: Negative for difficulty urinating and dysuria.        Low libido   Musculoskeletal: Negative for arthralgias and back pain.   Skin: Negative for color change and rash.   Allergic/Immunologic: Negative for environmental allergies and food allergies.   Neurological: Negative for dizziness and headaches.   Hematological: Negative for adenopathy. Does not bruise/bleed easily.   Psychiatric/Behavioral: Negative for agitation and confusion.       Objective:     There were no vitals taken for this visit.  Physical Exam  Constitutional:       Appearance: He is well-developed.   HENT:      Head: Normocephalic and atraumatic.   Eyes:      Pupils: Pupils are equal, round, and reactive to light.   Neck:      Thyroid: No thyromegaly.      Trachea: No tracheal deviation.   Cardiovascular:      Pulses:           Radial pulses are 2+ on the left side.        Dorsalis pedis pulses are 2+ on the right side and 2+ on the left side.      Heart sounds: S1 normal and S2 normal.   Pulmonary:      Effort: Pulmonary effort is normal.   Musculoskeletal:         General: Normal range of motion.      Cervical back: Neck supple.   Neurological:      Mental Status: He is alert and oriented to person, place, and time.      GCS: GCS eye subscore is 4. GCS verbal subscore is 5. GCS motor subscore is 6.   Psychiatric:         Speech: Speech normal.         Behavior: Behavior normal.         Thought Content: Thought content normal.          Assessment/Plan:     Diagnoses and all orders for this visit:    1. Hypotestosteronemia (Primary)  -     testosterone (ANDROGEL) 50 MG/5GM (1%) gel gel; Apply 100 mg  (2 tubes) on the skin as directed by provider Daily.  Dispense: 300 g; Refill: 3    2. Medication monitoring encounter  -     Urine Drug Screen - Urine, Clean Catch; Future      Discussed risks and benefits of various types of testosterone therapy and dosing. Patient has h/o factor V, so need to keep eye on his H/H and testosterone levels. Discussed efficacy and steady state of testosterone with topicals various peak and iris of injections. Patient would like to try topicals with lab follow up for H/H and testosterone in 6-8 weeks.  · Rx changes: Add AndroGel  · Patient Education: See above    Follow-up:     No follow-ups on file.    Preventative:  Health Maintenance   Topic Date Due   • COVID-19 Vaccine (1) Never done   • ANNUAL PHYSICAL  03/26/2022   • INFLUENZA VACCINE  10/01/2022   • TDAP/TD VACCINES (2 - Td or Tdap) 03/01/2027   • HEPATITIS C SCREENING  Completed   • Pneumococcal Vaccine 0-64  Aged Out         Weight      Alcohol use:  reports current alcohol use.  Nicotine status  reports that he has never smoked. He has never used smokeless tobacco.     Goals    None         RISK SCORE: 3      This document has been electronically signed by Isabelle Martinez MD on June 23, 2022 13:34 CDT    Isabelle Martinez MD PGY-3  Part of this note may be an electronic transcription/translation of spoken language to printed text using the Dragon Dictation System.

## 2022-06-28 NOTE — PROGRESS NOTES
I have reviewed the notes, assessments, and/or procedures performed by Dr. Martinez, I concur with her/his documentation and assessment and plan for Wang Edwards.                This document has been electronically signed by Louie Hinkle MD on June 28, 2022 16:34 CDT

## 2022-07-06 DIAGNOSIS — F32.A ANXIETY AND DEPRESSION: Primary | ICD-10-CM

## 2022-07-06 DIAGNOSIS — F41.9 ANXIETY AND DEPRESSION: Primary | ICD-10-CM

## 2022-07-19 DIAGNOSIS — Z51.81 MEDICATION MONITORING ENCOUNTER: Primary | ICD-10-CM

## 2022-08-17 DIAGNOSIS — F32.A ANXIETY AND DEPRESSION: ICD-10-CM

## 2022-08-17 DIAGNOSIS — F41.9 ANXIETY AND DEPRESSION: ICD-10-CM

## 2022-08-17 DIAGNOSIS — L21.0 SEBORRHEA CAPITIS IN ADULT: ICD-10-CM

## 2022-08-17 RX ORDER — KETOCONAZOLE 20 MG/ML
SHAMPOO TOPICAL 2 TIMES WEEKLY
Qty: 120 ML | Refills: 6 | Status: SHIPPED | OUTPATIENT
Start: 2022-08-18

## 2022-08-17 RX ORDER — OMEPRAZOLE 40 MG/1
40 CAPSULE, DELAYED RELEASE ORAL DAILY
Qty: 30 CAPSULE | Refills: 11 | Status: SHIPPED | OUTPATIENT
Start: 2022-08-17 | End: 2023-01-06 | Stop reason: SDUPTHER

## 2022-08-30 DIAGNOSIS — R06.09 POST-COVID CHRONIC DYSPNEA: Primary | ICD-10-CM

## 2022-08-30 DIAGNOSIS — U09.9 POST-COVID CHRONIC DYSPNEA: Primary | ICD-10-CM

## 2022-08-31 DIAGNOSIS — J30.89 SEASONAL ALLERGIC RHINITIS DUE TO OTHER ALLERGIC TRIGGER: ICD-10-CM

## 2022-08-31 RX ORDER — MONTELUKAST SODIUM 10 MG/1
10 TABLET ORAL NIGHTLY
Qty: 30 TABLET | Refills: 11 | Status: SHIPPED | OUTPATIENT
Start: 2022-08-31 | End: 2023-08-26

## 2022-10-12 ENCOUNTER — CLINICAL SUPPORT (OUTPATIENT)
Dept: FAMILY MEDICINE CLINIC | Facility: CLINIC | Age: 37
End: 2022-10-12

## 2022-10-12 PROCEDURE — 90471 IMMUNIZATION ADMIN: CPT | Performed by: STUDENT IN AN ORGANIZED HEALTH CARE EDUCATION/TRAINING PROGRAM

## 2022-10-12 PROCEDURE — 90686 IIV4 VACC NO PRSV 0.5 ML IM: CPT | Performed by: STUDENT IN AN ORGANIZED HEALTH CARE EDUCATION/TRAINING PROGRAM

## 2022-10-26 DIAGNOSIS — G44.311 INTRACTABLE ACUTE POST-TRAUMATIC HEADACHE: ICD-10-CM

## 2022-10-26 DIAGNOSIS — S06.0X0S CONCUSSION WITHOUT LOSS OF CONSCIOUSNESS, SEQUELA: Primary | ICD-10-CM

## 2022-10-26 RX ORDER — RIMEGEPANT SULFATE 75 MG/75MG
75 TABLET, ORALLY DISINTEGRATING ORAL DAILY PRN
Qty: 16 TABLET | Refills: 0 | Status: SHIPPED | OUTPATIENT
Start: 2022-10-26

## 2022-10-26 NOTE — PROGRESS NOTES
Patient slipped and fell and impacted head on concrete yesterday.  No LOC.  No NV.  No visual changes.  Likely concussion of mild severity.  Today endorsing headache.  Failed Tylenol.  Cannot take NSAID as he is on a NOAC for his DVT.  Triptan is contraindicated for head-trauma.  Will write for nurtec.      Hernando Escoto M.D. PGY-3  Chief Resident  [facility] Medicine Residency  [address] [place] [address]  Office: [phone]    This document has been electronically signed by Hernando Escoto MD on October 26, 2022 12:15 CDT

## 2022-11-08 DIAGNOSIS — H53.10 EYE STRAIN, RIGHT: ICD-10-CM

## 2022-11-08 DIAGNOSIS — G47.09 OTHER INSOMNIA: Primary | ICD-10-CM

## 2022-11-08 RX ORDER — DOXEPIN HYDROCHLORIDE 10 MG/ML
50 SOLUTION ORAL NIGHTLY PRN
Qty: 118 ML | Refills: 3 | Status: SHIPPED | OUTPATIENT
Start: 2022-11-08 | End: 2023-01-27 | Stop reason: SDUPTHER

## 2022-11-08 NOTE — PROGRESS NOTES
Patient having issues with occular pain, specifically in the right eye along the distribution of his superior oblique.  Suspecting eye strain from excessive screen time use.  Counseled on rest and possibly increasing his font size on EHR.  Will prescripe doxepin for a short course for sleep.  Also concerning as patient has a history of glaucoma.  However denies occular pain or visual changes at this time.      Hernando Escoto M.D. PGY-3  Chief Resident  [facility] Family Medicine Residency  [address] [place] [address]  Office: [phone]    This document has been electronically signed by Hernando Escoto MD on November 8, 2022 15:51 CST

## 2022-12-16 DIAGNOSIS — F41.9 ANXIETY AND DEPRESSION: Primary | ICD-10-CM

## 2022-12-16 DIAGNOSIS — F32.A ANXIETY AND DEPRESSION: Primary | ICD-10-CM

## 2022-12-16 RX ORDER — DULOXETIN HYDROCHLORIDE 20 MG/1
20 CAPSULE, DELAYED RELEASE ORAL DAILY
Qty: 90 CAPSULE | Refills: 1 | Status: SHIPPED | OUTPATIENT
Start: 2022-12-16

## 2022-12-16 NOTE — PROGRESS NOTES
FM Clinic Note    S:  States not doing well on Vortioxetine as it is causing him to lose motivation in some aspects.  Patient feels like he has did better on Duloxetine.    ROS:  Denies SI/HI    O:  Respirations 18  General:  NAD  Resp:  No respiratory distresss  Neuro:  Moving all extremities  Psych:  Behavior/Judgement wnL    A/P:  #Depression with anxeity  Has been on duloxetine in the past with success.  As patient does not feel he is doing as well on Vortioxetine, will d/c it and start duloxetine.  - Start Duloxetine 20mg Qday      Hernando Escoto M.D. PGY-3  Chief Resident  [facility] Family Medicine Residency  [address] [place] [address]  Office: [phone]    This document has been electronically signed by Hernando Escoto MD on December 16, 2022 12:08 CST

## 2023-01-06 DIAGNOSIS — Z00.00 WELLNESS EXAMINATION: Primary | ICD-10-CM

## 2023-01-06 DIAGNOSIS — F41.9 ANXIETY AND DEPRESSION: ICD-10-CM

## 2023-01-06 DIAGNOSIS — F32.A ANXIETY AND DEPRESSION: ICD-10-CM

## 2023-01-06 RX ORDER — OMEPRAZOLE 40 MG/1
40 CAPSULE, DELAYED RELEASE ORAL DAILY
Qty: 30 CAPSULE | Refills: 11 | Status: SHIPPED | OUTPATIENT
Start: 2023-01-06

## 2023-01-27 ENCOUNTER — LAB (OUTPATIENT)
Dept: LAB | Facility: HOSPITAL | Age: 38
End: 2023-01-27
Payer: COMMERCIAL

## 2023-01-27 DIAGNOSIS — G47.09 OTHER INSOMNIA: ICD-10-CM

## 2023-01-27 DIAGNOSIS — Z00.00 WELLNESS EXAMINATION: ICD-10-CM

## 2023-01-27 LAB
INR PPP: 1.14 (ref 0.8–1.2)
PROTHROMBIN TIME: 14.5 SECONDS (ref 11.1–15.3)

## 2023-01-27 PROCEDURE — 85610 PROTHROMBIN TIME: CPT

## 2023-01-27 PROCEDURE — 36415 COLL VENOUS BLD VENIPUNCTURE: CPT

## 2023-01-27 PROCEDURE — 80053 COMPREHEN METABOLIC PANEL: CPT

## 2023-01-27 PROCEDURE — 85025 COMPLETE CBC W/AUTO DIFF WBC: CPT

## 2023-01-27 PROCEDURE — 82306 VITAMIN D 25 HYDROXY: CPT

## 2023-01-27 RX ORDER — DOXEPIN HYDROCHLORIDE 10 MG/ML
50 SOLUTION ORAL NIGHTLY PRN
Qty: 120 ML | Refills: 3 | Status: SHIPPED | OUTPATIENT
Start: 2023-01-27

## 2023-01-28 LAB
25(OH)D3 SERPL-MCNC: 27.9 NG/ML (ref 30–100)
ALBUMIN SERPL-MCNC: 4.8 G/DL (ref 3.5–5.2)
ALBUMIN/GLOB SERPL: 2.4 G/DL
ALP SERPL-CCNC: 64 U/L (ref 39–117)
ALT SERPL W P-5'-P-CCNC: 33 U/L (ref 1–41)
ANION GAP SERPL CALCULATED.3IONS-SCNC: 11.7 MMOL/L (ref 5–15)
AST SERPL-CCNC: 24 U/L (ref 1–40)
BASOPHILS # BLD AUTO: 0.07 10*3/MM3 (ref 0–0.2)
BASOPHILS NFR BLD AUTO: 0.7 % (ref 0–1.5)
BILIRUB SERPL-MCNC: 0.4 MG/DL (ref 0–1.2)
BUN SERPL-MCNC: 10 MG/DL (ref 6–20)
BUN/CREAT SERPL: 8.7 (ref 7–25)
CALCIUM SPEC-SCNC: 9.2 MG/DL (ref 8.6–10.5)
CHLORIDE SERPL-SCNC: 100 MMOL/L (ref 98–107)
CO2 SERPL-SCNC: 26.3 MMOL/L (ref 22–29)
CREAT SERPL-MCNC: 1.15 MG/DL (ref 0.76–1.27)
DEPRECATED RDW RBC AUTO: 38.8 FL (ref 37–54)
EGFRCR SERPLBLD CKD-EPI 2021: 84.6 ML/MIN/1.73
EOSINOPHIL # BLD AUTO: 0.28 10*3/MM3 (ref 0–0.4)
EOSINOPHIL NFR BLD AUTO: 2.8 % (ref 0.3–6.2)
ERYTHROCYTE [DISTWIDTH] IN BLOOD BY AUTOMATED COUNT: 12.7 % (ref 12.3–15.4)
GLOBULIN UR ELPH-MCNC: 2 GM/DL
GLUCOSE SERPL-MCNC: 93 MG/DL (ref 65–99)
HCT VFR BLD AUTO: 45.1 % (ref 37.5–51)
HGB BLD-MCNC: 15.2 G/DL (ref 13–17.7)
IMM GRANULOCYTES # BLD AUTO: 0.04 10*3/MM3 (ref 0–0.05)
IMM GRANULOCYTES NFR BLD AUTO: 0.4 % (ref 0–0.5)
LYMPHOCYTES # BLD AUTO: 3.11 10*3/MM3 (ref 0.7–3.1)
LYMPHOCYTES NFR BLD AUTO: 31.3 % (ref 19.6–45.3)
MCH RBC QN AUTO: 29 PG (ref 26.6–33)
MCHC RBC AUTO-ENTMCNC: 33.7 G/DL (ref 31.5–35.7)
MCV RBC AUTO: 86.1 FL (ref 79–97)
MONOCYTES # BLD AUTO: 0.92 10*3/MM3 (ref 0.1–0.9)
MONOCYTES NFR BLD AUTO: 9.3 % (ref 5–12)
NEUTROPHILS NFR BLD AUTO: 5.51 10*3/MM3 (ref 1.7–7)
NEUTROPHILS NFR BLD AUTO: 55.5 % (ref 42.7–76)
NRBC BLD AUTO-RTO: 0 /100 WBC (ref 0–0.2)
PLATELET # BLD AUTO: 235 10*3/MM3 (ref 140–450)
PMV BLD AUTO: 12.3 FL (ref 6–12)
POTASSIUM SERPL-SCNC: 3.8 MMOL/L (ref 3.5–5.2)
PROT SERPL-MCNC: 6.8 G/DL (ref 6–8.5)
RBC # BLD AUTO: 5.24 10*6/MM3 (ref 4.14–5.8)
SODIUM SERPL-SCNC: 138 MMOL/L (ref 136–145)
WBC NRBC COR # BLD: 9.93 10*3/MM3 (ref 3.4–10.8)

## 2023-01-30 ENCOUNTER — LAB (OUTPATIENT)
Dept: LAB | Facility: HOSPITAL | Age: 38
End: 2023-01-30
Payer: COMMERCIAL

## 2023-01-30 DIAGNOSIS — Z51.81 THERAPEUTIC DRUG MONITORING: ICD-10-CM

## 2023-01-30 DIAGNOSIS — Z51.81 THERAPEUTIC DRUG MONITORING: Primary | ICD-10-CM

## 2023-01-30 DIAGNOSIS — E55.9 HYPOVITAMINOSIS D: ICD-10-CM

## 2023-01-30 DIAGNOSIS — E29.1 HYPOGONADISM IN MALE: ICD-10-CM

## 2023-01-30 LAB
AMPHET+METHAMPHET UR QL: NEGATIVE
AMPHETAMINES UR QL: NEGATIVE
BARBITURATES UR QL SCN: NEGATIVE
BENZODIAZ UR QL SCN: NEGATIVE
BUPRENORPHINE SERPL-MCNC: NEGATIVE NG/ML
CANNABINOIDS SERPL QL: NEGATIVE
COCAINE UR QL: NEGATIVE
METHADONE UR QL SCN: NEGATIVE
OPIATES UR QL: NEGATIVE
OXYCODONE UR QL SCN: NEGATIVE
PCP UR QL SCN: NEGATIVE
PROPOXYPH UR QL: NEGATIVE
TRICYCLICS UR QL SCN: NEGATIVE

## 2023-01-30 PROCEDURE — 80306 DRUG TEST PRSMV INSTRMNT: CPT

## 2023-01-30 RX ORDER — TESTOSTERONE CYPIONATE 200 MG/ML
75 INJECTION, SOLUTION INTRAMUSCULAR
Qty: 2 ML | Refills: 2 | Status: SHIPPED | OUTPATIENT
Start: 2023-01-30

## 2023-01-30 RX ORDER — ERGOCALCIFEROL 1.25 MG/1
50000 CAPSULE ORAL
Qty: 12 CAPSULE | Refills: 3 | Status: SHIPPED | OUTPATIENT
Start: 2023-01-30

## 2023-01-30 NOTE — PROGRESS NOTES
Family Medicine Residency  Hernando Escoto MD    Subjective:     Wang Edwards is a 37 y.o. male who presents for presents for the follow up of his labs.    At this time patient is going to be trying for a child with his wife.  He is interested in pursuing treatment for low T prior to this.  We also reviewed his other labs and he does not have issues with supplementing his vit D.  Still taking NOAC for his Factor V Leiden.    The following portions of the patient's history were reviewed and updated as appropriate: allergies, current medications, past family history, past medical history, past social history, past surgical history and problem list.    Past Medical Hx:  Past Medical History:   Diagnosis Date   • Anxiety    • Plantar fasciitis    • Verruca        Past Surgical Hx:  Past Surgical History:   Procedure Laterality Date   • SKIN BIOPSY  2021       Current Meds:    Current Outpatient Medications:   •  albuterol sulfate  (90 Base) MCG/ACT inhaler, Inhale 2 puffs Every 4 (Four) Hours As Needed for Wheezing., Disp: 18 g, Rfl: 11  •  cetirizine (zyrTEC) 10 MG tablet, Take 1 tablet by mouth Daily., Disp: 30 tablet, Rfl: 11  •  clobetasol (TEMOVATE) 0.05 % cream, Apply topically to the appropriate area as directed 2 (Two) Times a Day., Disp: 30 g, Rfl: 1  •  doxepin (SINEquan) 10 MG/ML solution, Take 5 mL by mouth At Night As Needed for Sleep., Disp: 120 mL, Rfl: 3  •  DULoxetine (CYMBALTA) 20 MG capsule, Take 1 capsule by mouth Daily., Disp: 90 capsule, Rfl: 1  •  fluticasone (Flonase) 50 MCG/ACT nasal spray, 2 sprays into the nostril(s) as directed by provider Daily., Disp: 18.2 mL, Rfl: 11  •  ketoconazole (Nizoral) 2 % shampoo, Apply  topically to the appropriate area as directed 2 (Two) Times a Week., Disp: 120 mL, Rfl: 6  •  montelukast (Singulair) 10 MG tablet, Take 1 tablet by mouth Every Night., Disp: 30 tablet, Rfl: 11  •  omeprazole (priLOSEC) 40 MG capsule, Take 1 capsule by mouth Daily.,  Disp: 30 capsule, Rfl: 11  •  ondansetron (Zofran) 8 MG tablet, Take 1 tablet by mouth Every 8 (Eight) Hours As Needed for Nausea or Vomiting., Disp: 90 tablet, Rfl: 0  •  Rimegepant Sulfate (Nurtec) 75 MG tablet dispersible tablet, Dissolve 1 tablet on or under the tongue once Daily As Needed for headache., Disp: 16 tablet, Rfl: 0  •  rivaroxaban (Xarelto) 20 MG tablet, Take 1 tablet by mouth Daily **Take with food**, Disp: 90 tablet, Rfl: 3  •  Semaglutide, 2 MG/DOSE, 8 MG/3ML solution pen-injector, Inject 2 mg under the skin into the appropriate area as directed 1 (One) Time Per Week., Disp: 9 mL, Rfl: 3  •  tamsulosin (FLOMAX) 0.4 MG capsule 24 hr capsule, Take 1 capsule by mouth Daily., Disp: 30 capsule, Rfl: 1  •  Testosterone Cypionate 100 MG/ML solution, Inject 75 mg as directed Every 14 (Fourteen) Days., Disp: 1.5 mL, Rfl: 2  •  vitamin D (ERGOCALCIFEROL) 1.25 MG (71304 UT) capsule capsule, Take 1 capsule by mouth Every 7 (Seven) Days., Disp: 12 capsule, Rfl: 3    Current Facility-Administered Medications:   •  triamcinolone acetonide (KENALOG-40) injection 40 mg, 40 mg, Intra-articular, Once, Ruchi Wilkins MD    Allergies:  No Known Allergies    Family Hx:  Family History   Problem Relation Age of Onset   • Osteoarthritis Mother    • Factor V Leiden deficiency Father    • Bipolar disorder Father    • Alcohol abuse Sister         Substance abuse   • No Known Problems Brother    • No Known Problems Daughter    • No Known Problems Son    • Alcohol abuse Paternal Aunt    • Bipolar disorder Paternal Aunt    • No Known Problems Maternal Grandmother    • No Known Problems Maternal Grandfather    • No Known Problems Paternal Grandmother    • Liver cancer Paternal Grandfather    • Bipolar disorder Paternal Aunt    • Alcohol abuse Paternal Aunt    • Hepatitis Paternal Aunt         Social History:  Social History     Socioeconomic History   • Marital status:    Tobacco Use   • Smoking status: Never   •  Smokeless tobacco: Never   Substance and Sexual Activity   • Alcohol use: Yes   • Drug use: No   • Sexual activity: Yes     Partners: Female       Review of Systems  Review of Systems   Constitutional: Negative for chills and fever.   HENT: Negative for congestion, rhinorrhea and sore throat.    Eyes: Negative for visual disturbance.   Respiratory: Negative for chest tightness and shortness of breath.    Cardiovascular: Negative for chest pain and palpitations.   Gastrointestinal: Negative for abdominal pain, diarrhea, nausea and vomiting.   Endocrine: Negative for polyuria.   Genitourinary: Negative for difficulty urinating and dysuria.   Musculoskeletal: Negative for back pain and myalgias.   Skin: Negative for rash and wound.   Neurological: Negative for dizziness, weakness and numbness.   Hematological: Does not bruise/bleed easily.   Psychiatric/Behavioral: Negative for agitation, behavioral problems and confusion.       Objective:     There were no vitals taken for this visit.  Physical Exam  Constitutional:       Appearance: Normal appearance.   HENT:      Head: Normocephalic and atraumatic.      Right Ear: External ear normal.      Left Ear: External ear normal.   Eyes:      Extraocular Movements: Extraocular movements intact.   Pulmonary:      Effort: Pulmonary effort is normal. No respiratory distress.   Musculoskeletal:      Cervical back: Normal range of motion.      Right lower leg: No edema.      Left lower leg: No edema.   Neurological:      General: No focal deficit present.      Mental Status: He is alert and oriented to person, place, and time.   Psychiatric:         Mood and Affect: Mood normal.         Behavior: Behavior normal.              Assessment/Plan:     Diagnoses and all orders for this visit:    1. Hypogonadism in male (Primary)  Low T for his age.  Should be about 500 and he is at 239.7 as of 4/2022.  Will be starting at 75mg IM [id number].  Will recheck T midway between injections in 1  syeda.  Chuckie is appropriate.  UDS to be collected today.  -     Testosterone Cypionate 100 MG/ML solution; Inject 75 mg as directed Every 14 (Fourteen) Days.  Dispense: 1.5 mL; Refill: 2    2. Hypovitaminosis D  Last vit D 27.9.  Low and needs repletion.  -     vitamin D (ERGOCALCIFEROL) 1.25 MG (46124 UT) capsule capsule; Take 1 capsule by mouth Every 7 (Seven) Days.  Dispense: 12 capsule; Refill: 3    3. Therapeutic drug monitoring  UDS for controls as above.  -     Urine Drug Screen - Urine, Clean Catch; Future             Follow-up:     1 month    Preventative:  Health Maintenance   Topic Date Due   • COVID-19 Vaccine (1) Never done   • ANNUAL PHYSICAL  03/25/2022   • TDAP/TD VACCINES (2 - Td or Tdap) 03/01/2027   • HEPATITIS C SCREENING  Completed   • INFLUENZA VACCINE  Completed   • Pneumococcal Vaccine 0-64  Aged Out       Alcohol use:  reports current alcohol use.  Nicotine status  reports that he has never smoked. He has never used smokeless tobacco.    RISK SCORE: 1      This document has been electronically signed by Hernando Escoto MD on January 30, 2023 15:06 CST

## 2023-04-10 DIAGNOSIS — E29.1 HYPOGONADISM IN MALE: ICD-10-CM

## 2023-04-10 DIAGNOSIS — L42 PITYRIASIS ROSEA: Primary | ICD-10-CM

## 2023-04-10 RX ORDER — VALACYCLOVIR HYDROCHLORIDE 500 MG/1
500 TABLET, FILM COATED ORAL 2 TIMES DAILY
Qty: 14 TABLET | Refills: 0 | Status: SHIPPED | OUTPATIENT
Start: 2023-04-10 | End: 2023-04-20

## 2023-04-13 ENCOUNTER — OFFICE VISIT (OUTPATIENT)
Dept: FAMILY MEDICINE CLINIC | Facility: CLINIC | Age: 38
End: 2023-04-13
Payer: COMMERCIAL

## 2023-04-13 DIAGNOSIS — L42 PITYRIASIS ROSEA: Primary | ICD-10-CM

## 2023-04-13 RX ORDER — VALACYCLOVIR HYDROCHLORIDE 500 MG/1
500 TABLET, FILM COATED ORAL 2 TIMES DAILY
Qty: 14 TABLET | Refills: 0
Start: 2023-04-13 | End: 2023-04-20

## 2023-04-13 NOTE — PROGRESS NOTES
Family Medicine Residency  Hernando Escoto MD    Subjective:     Wang Edwards is a 37 y.o. male who presents for new onset rash.    Patient has developed a rash mostly on his scalp, face, behind his ears, hips, and inguinal area.  Provided pictures of the rash and the ones on his capital area seemed to be vesicular in a manner and ruptured with clear fluid.  Denies that the rashes are itchy and denies pain with the rashes.  Symmetric in distribution and has been applying moisturizer to the rash at home.    The following portions of the patient's history were reviewed and updated as appropriate: allergies, current medications, past family history, past medical history, past social history, past surgical history and problem list.    Past Medical Hx:  Past Medical History:   Diagnosis Date   • Anxiety    • Plantar fasciitis    • Verruca        Past Surgical Hx:  Past Surgical History:   Procedure Laterality Date   • SKIN BIOPSY  2021       Current Meds:    Current Outpatient Medications:   •  triamcinolone (KENALOG) 0.1 % ointment, Apply 1 application topically to the appropriate area as directed 2 (Two) Times a Day., Disp: 454 g, Rfl: 0  •  valACYclovir (Valtrex) 500 MG tablet, Take 1 tablet by mouth 2 (Two) Times a Day for 7 days., Disp: 14 tablet, Rfl: 0  •  albuterol sulfate  (90 Base) MCG/ACT inhaler, Inhale 2 puffs Every 4 (Four) Hours As Needed for Wheezing., Disp: 18 g, Rfl: 11  •  cetirizine (zyrTEC) 10 MG tablet, Take 1 tablet by mouth Daily., Disp: 30 tablet, Rfl: 11  •  clobetasol (TEMOVATE) 0.05 % cream, Apply topically to the appropriate area as directed 2 (Two) Times a Day., Disp: 30 g, Rfl: 1  •  doxepin (SINEquan) 10 MG/ML solution, Take 5 mL by mouth At Night As Needed for Sleep., Disp: 120 mL, Rfl: 3  •  DULoxetine (CYMBALTA) 20 MG capsule, Take 1 capsule by mouth Daily., Disp: 90 capsule, Rfl: 1  •  fluticasone (Flonase) 50 MCG/ACT nasal spray, 2 sprays into the nostril(s) as directed by  provider Daily., Disp: 18.2 mL, Rfl: 11  •  ketoconazole (Nizoral) 2 % shampoo, Apply  topically to the appropriate area as directed 2 (Two) Times a Week., Disp: 120 mL, Rfl: 6  •  montelukast (Singulair) 10 MG tablet, Take 1 tablet by mouth Every Night., Disp: 30 tablet, Rfl: 11  •  omeprazole (priLOSEC) 40 MG capsule, Take 1 capsule by mouth Daily., Disp: 30 capsule, Rfl: 11  •  ondansetron (Zofran) 8 MG tablet, Take 1 tablet by mouth Every 8 (Eight) Hours As Needed for Nausea or Vomiting., Disp: 90 tablet, Rfl: 0  •  Rimegepant Sulfate (Nurtec) 75 MG tablet dispersible tablet, Dissolve 1 tablet on or under the tongue once Daily As Needed for headache., Disp: 16 tablet, Rfl: 0  •  rivaroxaban (Xarelto) 20 MG tablet, Take 1 tablet by mouth Daily **Take with food**, Disp: 90 tablet, Rfl: 3  •  Semaglutide, 2 MG/DOSE, 8 MG/3ML solution pen-injector, Inject 2 mg under the skin into the appropriate area as directed 1 (One) Time Per Week., Disp: 9 mL, Rfl: 3  •  tamsulosin (FLOMAX) 0.4 MG capsule 24 hr capsule, Take 1 capsule by mouth Daily., Disp: 30 capsule, Rfl: 1  •  Testosterone Cypionate (Depo-Testosterone) 200 MG/ML injection, Inject 0.38 mL into the appropriate muscle as directed by prescriber Every 14 (Fourteen) Days., Disp: 2 mL, Rfl: 2  •  vitamin D (ERGOCALCIFEROL) 1.25 MG (68856 UT) capsule capsule, Take 1 capsule by mouth Every 7 (Seven) Days., Disp: 12 capsule, Rfl: 3    Current Facility-Administered Medications:   •  triamcinolone acetonide (KENALOG-40) injection 40 mg, 40 mg, Intra-articular, Once, Ruchi Wilkins MD    Allergies:  No Known Allergies    Family Hx:  Family History   Problem Relation Age of Onset   • Osteoarthritis Mother    • Factor V Leiden deficiency Father    • Bipolar disorder Father    • Alcohol abuse Sister         Substance abuse   • No Known Problems Brother    • No Known Problems Daughter    • No Known Problems Son    • Alcohol abuse Paternal Aunt    • Bipolar disorder Paternal  Aunt    • No Known Problems Maternal Grandmother    • No Known Problems Maternal Grandfather    • No Known Problems Paternal Grandmother    • Liver cancer Paternal Grandfather    • Bipolar disorder Paternal Aunt    • Alcohol abuse Paternal Aunt    • Hepatitis Paternal Aunt         Social History:  Social History     Socioeconomic History   • Marital status:    Tobacco Use   • Smoking status: Never   • Smokeless tobacco: Never   Substance and Sexual Activity   • Alcohol use: Yes   • Drug use: No   • Sexual activity: Yes     Partners: Female       Review of Systems  Review of Systems   Constitutional: Negative for chills and fever.   HENT: Negative for congestion, rhinorrhea and sore throat.    Eyes: Negative for visual disturbance.   Respiratory: Negative for chest tightness and shortness of breath.    Cardiovascular: Negative for chest pain and palpitations.   Gastrointestinal: Negative for abdominal pain, diarrhea, nausea and vomiting.   Endocrine: Negative for polyuria.   Genitourinary: Negative for difficulty urinating and dysuria.   Musculoskeletal: Negative for back pain and myalgias.   Skin: Positive for rash. Negative for wound.   Neurological: Negative for dizziness, weakness and numbness.   Hematological: Does not bruise/bleed easily.   Psychiatric/Behavioral: Negative for agitation, behavioral problems and confusion.       Objective:     There were no vitals taken for this visit.  Physical Exam  Constitutional:       General: He is not in acute distress.         Comments: Marked areas affected by patches of vesicular eruptions that appear well demarcated with central clearing.  No mcmillan color for erupted lesions.     HENT:      Head: Normocephalic and atraumatic.      Right Ear: External ear normal.      Left Ear: External ear normal.      Nose: Nose normal.   Eyes:      Extraocular Movements: Extraocular movements intact.   Pulmonary:      Effort: No respiratory distress.   Musculoskeletal:          General: No deformity.   Skin:     General: Skin is warm and dry.      Findings: Rash present.   Neurological:      General: No focal deficit present.      Mental Status: He is alert.   Psychiatric:         Mood and Affect: Mood normal.         Behavior: Behavior normal.          Assessment/Plan:     Diagnoses and all orders for this visit:    1. Pityriasis rosea (Primary)  Vesicular rash makes this likely a viral rash.  Given patches with central clearing, likely to be pityriasis rosea.  No purulence reported and ruptured lesions do not appear to have a mcmillan crust, so likely not folliculitis.  At this time will provide antiviral to reduce duration and severity.  Also will provide a steroid ointment to suppress inflammation to minimize possible scars.  -     valACYclovir (Valtrex) 500 MG tablet; Take 1 tablet by mouth 2 (Two) Times a Day for 7 days.  Dispense: 14 tablet; Refill: 0  -     triamcinolone (KENALOG) 0.1 % ointment; Apply 1 application topically to the appropriate area as directed 2 (Two) Times a Day.  Dispense: 454 g; Refill: 0             Follow-up:     As needed    Preventative:  Health Maintenance   Topic Date Due   • COVID-19 Vaccine (1) Never done   • ANNUAL PHYSICAL  03/25/2022   • INFLUENZA VACCINE  08/01/2023   • TDAP/TD VACCINES (2 - Td or Tdap) 03/01/2027   • HEPATITIS C SCREENING  Completed   • Pneumococcal Vaccine 0-64  Aged Out       Alcohol use:  reports current alcohol use.  Nicotine status  reports that he has never smoked. He has never used smokeless tobacco.    RISK SCORE: 1      This document has been electronically signed by Hernando Escoto MD on April 13, 2023 12:48 CDT

## 2023-04-25 DIAGNOSIS — N46.9 INFERTILITY MALE: Primary | ICD-10-CM

## 2023-04-25 NOTE — PROGRESS NOTES
Starting work up for issues in conception.  Would like to evaluate patient for male-gender related issues.  Will start with semen analysis.      Hernando Escoto M.D. PGY-3  Chief Resident  [facility]  [address] [place], [place] [id number]  Office: [phone]    This document has been electronically signed by Hernando Escoto MD on April 25, 2023 15:21 CDT

## 2023-05-01 ENCOUNTER — LAB (OUTPATIENT)
Dept: LAB | Facility: HOSPITAL | Age: 38
End: 2023-05-01
Payer: COMMERCIAL

## 2023-05-01 ENCOUNTER — OFFICE VISIT (OUTPATIENT)
Dept: FAMILY MEDICINE CLINIC | Facility: CLINIC | Age: 38
End: 2023-05-01
Payer: COMMERCIAL

## 2023-05-01 VITALS
WEIGHT: 192 LBS | HEART RATE: 99 BPM | SYSTOLIC BLOOD PRESSURE: 120 MMHG | OXYGEN SATURATION: 98 % | DIASTOLIC BLOOD PRESSURE: 80 MMHG | TEMPERATURE: 97.1 F | BODY MASS INDEX: 26.88 KG/M2 | HEIGHT: 71 IN

## 2023-05-01 DIAGNOSIS — L98.9 LEG SKIN LESION, LEFT: Primary | ICD-10-CM

## 2023-05-01 NOTE — PROGRESS NOTES
Family Medicine Residency  Procedure note   Eddie Maciel MD           Procedure Name: Punch Biopsy   Indication: Evaluate etiology of the skin lesion   Location: Left lateral thigh region   Pre-Procedure Diagnosis:  Worrisome skin lesion, Dermatitis vs Pityriasis rosea   Post-Procedure Diagnosis: area of skin lesion removed with 4 mm punch biopsy   Informed consent: Procedure, alternate treatment options, risks, and benefits were thoroughly explained to the patient and informed consent was obtained before the procedure started.    PROCEDURE  An appropriate timeout was performed. The area was prepped and draped in the usual sterile fashion. Local anesthesia was infiltrated around and into the area of interest with  3 cc of  Lidocaine 1% with epinephrine.   There was an obvious skin lesion which appeared scaly in nature. A punch biopsy of the worrisome skin lesion was obtained with a portion of the normal tissue included. The predominant portion of the biopsy was of the lesion itself. Lesion was removed. Attention was turned toward the area. Pressure was held and the area was hemostatic. Applied 4 x 4 dressing with tape and patient tolerated it well.         Eddie Maciel MD PGY-3  [facility]   This document has been electronically signed by Eddie Maciel MD on May 1, 2023 20:08 CDT

## 2023-05-02 NOTE — PROGRESS NOTES
I have spoken with the patient .  I have reviewed the notes, assessments, and/or procedures performed by Dr. Eddie Maciel,   I concur with   her  documentation and assessment and plan for Wang Edwards.          This document has been electronically signed by Murali Heredia MD on May 2, 2023 14:09 CDT

## 2023-05-08 ENCOUNTER — DOCUMENTATION (OUTPATIENT)
Dept: PSYCHIATRY | Facility: HOSPITAL | Age: 38
End: 2023-05-08
Payer: COMMERCIAL

## 2023-05-08 DIAGNOSIS — B09 VIRAL RASH: Primary | ICD-10-CM

## 2023-05-08 LAB — REF LAB TEST METHOD: NORMAL

## 2023-05-08 RX ORDER — VALACYCLOVIR HYDROCHLORIDE 1 G/1
1000 TABLET, FILM COATED ORAL DAILY
Qty: 30 TABLET | Refills: 2 | Status: SHIPPED | OUTPATIENT
Start: 2023-05-08

## 2023-05-09 ENCOUNTER — LAB (OUTPATIENT)
Dept: LAB | Facility: HOSPITAL | Age: 38
End: 2023-05-09
Payer: COMMERCIAL

## 2023-05-09 DIAGNOSIS — E29.1 HYPOGONADISM IN MALE: ICD-10-CM

## 2023-05-09 DIAGNOSIS — L51.9 ERYTHEMA MULTIFORME, UNSPECIFIED: ICD-10-CM

## 2023-05-09 DIAGNOSIS — Z51.81 MEDICATION MONITORING ENCOUNTER: ICD-10-CM

## 2023-05-09 DIAGNOSIS — L51.9 ERYTHEMA MULTIFORME, UNSPECIFIED: Primary | ICD-10-CM

## 2023-05-09 LAB
INR PPP: 1.68 (ref 0.8–1.2)
PROTHROMBIN TIME: 19.4 SECONDS (ref 11.1–15.3)

## 2023-05-09 PROCEDURE — 86696 HERPES SIMPLEX TYPE 2 TEST: CPT

## 2023-05-09 PROCEDURE — 86038 ANTINUCLEAR ANTIBODIES: CPT

## 2023-05-09 PROCEDURE — 36415 COLL VENOUS BLD VENIPUNCTURE: CPT

## 2023-05-09 PROCEDURE — 85652 RBC SED RATE AUTOMATED: CPT

## 2023-05-09 PROCEDURE — 86140 C-REACTIVE PROTEIN: CPT

## 2023-05-09 PROCEDURE — 84402 ASSAY OF FREE TESTOSTERONE: CPT

## 2023-05-09 PROCEDURE — 85610 PROTHROMBIN TIME: CPT

## 2023-05-09 PROCEDURE — 84403 ASSAY OF TOTAL TESTOSTERONE: CPT

## 2023-05-09 PROCEDURE — 80076 HEPATIC FUNCTION PANEL: CPT

## 2023-05-09 PROCEDURE — 87532 HHV-6 DNA AMP PROBE: CPT

## 2023-05-09 PROCEDURE — 86695 HERPES SIMPLEX TYPE 1 TEST: CPT

## 2023-05-09 PROCEDURE — 85025 COMPLETE CBC W/AUTO DIFF WBC: CPT

## 2023-05-09 PROCEDURE — 86787 VARICELLA-ZOSTER ANTIBODY: CPT

## 2023-05-09 NOTE — PROGRESS NOTES
Biopsy returned positive for lymphocytic inclusions indicative of either drug reaction or viral exanthem.  Given symptoms are resolving with Valtrex, will try and determine and etiology via serology.  Referral to dermatology is going to be needed to follow up as the patient had these symptoms 1 month prior to first course of valtrex, and I am unsure how long the patient will need to be on valtrex in the future.    Diagnoses and all orders for this visit:    1. Erythema multiforme, unspecified (Primary)  -     CBC w AUTO Differential; Future  -     Hepatic Function Panel; Future  -     C-reactive protein; Future  -     Sedimentation Rate; Future  -     GIANNA; Future  -     HSV 1 and 2-Specific Ab, IgG; Future  -     Varicella zoster antibody, IgG; Future  -     HHV-6 DNA Probe, Amplified; Future          Hernando Escoto M.D. PGY-3  Chief Resident  [facility]  [address] [place] [address]  Office: [phone]    This document has been electronically signed by Hernando Escoto MD on May 9, 2023 13:03 CDT

## 2023-05-10 LAB
ALBUMIN SERPL-MCNC: 4.3 G/DL (ref 3.5–5.2)
ALP SERPL-CCNC: 60 U/L (ref 39–117)
ALT SERPL W P-5'-P-CCNC: 38 U/L (ref 1–41)
AST SERPL-CCNC: 25 U/L (ref 1–40)
BASOPHILS # BLD AUTO: 0.06 10*3/MM3 (ref 0–0.2)
BASOPHILS NFR BLD AUTO: 1 % (ref 0–1.5)
BILIRUB CONJ SERPL-MCNC: <0.2 MG/DL (ref 0–0.3)
BILIRUB INDIRECT SERPL-MCNC: NORMAL MG/DL
BILIRUB SERPL-MCNC: 0.5 MG/DL (ref 0–1.2)
CRP SERPL-MCNC: 0.67 MG/DL (ref 0–0.5)
DEPRECATED RDW RBC AUTO: 40.5 FL (ref 37–54)
EOSINOPHIL # BLD AUTO: 0.22 10*3/MM3 (ref 0–0.4)
EOSINOPHIL NFR BLD AUTO: 3.7 % (ref 0.3–6.2)
ERYTHROCYTE [DISTWIDTH] IN BLOOD BY AUTOMATED COUNT: 12.5 % (ref 12.3–15.4)
ERYTHROCYTE [SEDIMENTATION RATE] IN BLOOD: 10 MM/HR (ref 0–15)
HCT VFR BLD AUTO: 42.1 % (ref 37.5–51)
HGB BLD-MCNC: 14.7 G/DL (ref 13–17.7)
IMM GRANULOCYTES # BLD AUTO: 0.04 10*3/MM3 (ref 0–0.05)
IMM GRANULOCYTES NFR BLD AUTO: 0.7 % (ref 0–0.5)
LYMPHOCYTES # BLD AUTO: 2.04 10*3/MM3 (ref 0.7–3.1)
LYMPHOCYTES NFR BLD AUTO: 34.1 % (ref 19.6–45.3)
MCH RBC QN AUTO: 30.7 PG (ref 26.6–33)
MCHC RBC AUTO-ENTMCNC: 34.9 G/DL (ref 31.5–35.7)
MCV RBC AUTO: 87.9 FL (ref 79–97)
MONOCYTES # BLD AUTO: 0.63 10*3/MM3 (ref 0.1–0.9)
MONOCYTES NFR BLD AUTO: 10.5 % (ref 5–12)
NEUTROPHILS NFR BLD AUTO: 2.99 10*3/MM3 (ref 1.7–7)
NEUTROPHILS NFR BLD AUTO: 50 % (ref 42.7–76)
NRBC BLD AUTO-RTO: 0 /100 WBC (ref 0–0.2)
PLATELET # BLD AUTO: 190 10*3/MM3 (ref 140–450)
PMV BLD AUTO: 12.4 FL (ref 6–12)
PROT SERPL-MCNC: 6.8 G/DL (ref 6–8.5)
RBC # BLD AUTO: 4.79 10*6/MM3 (ref 4.14–5.8)
WBC NRBC COR # BLD: 5.98 10*3/MM3 (ref 3.4–10.8)

## 2023-05-11 LAB
ANA SER QL: NEGATIVE
HSV1 IGG SER IA-ACNC: <0.91 INDEX (ref 0–0.9)
HSV2 IGG SER IA-ACNC: <0.91 INDEX (ref 0–0.9)
VZV IGG SER IA-ACNC: 2826 INDEX

## 2023-05-14 LAB — HHV6 DNA SPEC NAA+PROBE: NEGATIVE

## 2023-05-16 DIAGNOSIS — D68.51 HETEROZYGOUS FACTOR V LEIDEN MUTATION: ICD-10-CM

## 2023-05-16 LAB
TESTOST FREE SERPL-MCNC: 5.4 PG/ML (ref 8.7–25.1)
TESTOST SERPL-MCNC: 173.7 NG/DL (ref 264–916)

## 2023-05-18 DIAGNOSIS — E29.1 HYPOGONADISM IN MALE: Primary | ICD-10-CM
